# Patient Record
Sex: FEMALE | Race: WHITE | NOT HISPANIC OR LATINO | Employment: OTHER | URBAN - METROPOLITAN AREA
[De-identification: names, ages, dates, MRNs, and addresses within clinical notes are randomized per-mention and may not be internally consistent; named-entity substitution may affect disease eponyms.]

---

## 2017-04-03 RX ORDER — SUMATRIPTAN 50 MG/1
TABLET, FILM COATED ORAL
Qty: 9 TABLET | Refills: 0 | Status: SHIPPED | OUTPATIENT
Start: 2017-04-03 | End: 2018-04-02 | Stop reason: SDUPTHER

## 2017-05-12 RX ORDER — PAROXETINE HYDROCHLORIDE 20 MG/1
TABLET, FILM COATED ORAL
Qty: 90 TABLET | Refills: 0 | Status: SHIPPED | OUTPATIENT
Start: 2017-05-12 | End: 2017-08-16 | Stop reason: SDUPTHER

## 2017-05-30 ENCOUNTER — OFFICE VISIT (OUTPATIENT)
Dept: INTERNAL MEDICINE | Facility: CLINIC | Age: 32
End: 2017-05-30

## 2017-05-30 VITALS
RESPIRATION RATE: 16 BRPM | HEART RATE: 72 BPM | WEIGHT: 143 LBS | HEIGHT: 65 IN | TEMPERATURE: 98.2 F | OXYGEN SATURATION: 98 % | SYSTOLIC BLOOD PRESSURE: 102 MMHG | BODY MASS INDEX: 23.82 KG/M2 | DIASTOLIC BLOOD PRESSURE: 56 MMHG

## 2017-05-30 DIAGNOSIS — Z00.00 ENCOUNTER FOR ANNUAL HEALTH EXAMINATION: Primary | ICD-10-CM

## 2017-05-30 DIAGNOSIS — Z23 NEED FOR PNEUMOCOCCAL VACCINATION: ICD-10-CM

## 2017-05-30 PROCEDURE — 90732 PPSV23 VACC 2 YRS+ SUBQ/IM: CPT | Performed by: INTERNAL MEDICINE

## 2017-05-30 PROCEDURE — 99395 PREV VISIT EST AGE 18-39: CPT | Performed by: INTERNAL MEDICINE

## 2017-05-30 PROCEDURE — 90471 IMMUNIZATION ADMIN: CPT | Performed by: INTERNAL MEDICINE

## 2017-06-01 RX ORDER — ALPRAZOLAM 0.5 MG/1
TABLET ORAL
Qty: 60 TABLET | Refills: 0
Start: 2017-06-01 | End: 2017-12-19 | Stop reason: SDUPTHER

## 2017-06-01 NOTE — TELEPHONE ENCOUNTER
Last Refill:  11.30.2016  Last O.V:      5.30.2017  Next O.V:     11.32.2017  John:          6.1.2017

## 2017-06-05 RX ORDER — ALPRAZOLAM 0.5 MG/1
TABLET ORAL
Qty: 60 TABLET | Refills: 0 | OUTPATIENT
Start: 2017-06-05

## 2017-06-19 RX ORDER — ATORVASTATIN CALCIUM 40 MG/1
TABLET, FILM COATED ORAL
Qty: 30 TABLET | Refills: 4 | Status: SHIPPED | OUTPATIENT
Start: 2017-06-19 | End: 2017-12-04 | Stop reason: SDUPTHER

## 2017-08-16 RX ORDER — PAROXETINE HYDROCHLORIDE 20 MG/1
TABLET, FILM COATED ORAL
Qty: 90 TABLET | Refills: 0 | Status: SHIPPED | OUTPATIENT
Start: 2017-08-16 | End: 2017-11-15 | Stop reason: SDUPTHER

## 2017-11-15 RX ORDER — PAROXETINE HYDROCHLORIDE 20 MG/1
TABLET, FILM COATED ORAL
Qty: 90 TABLET | Refills: 0 | Status: SHIPPED | OUTPATIENT
Start: 2017-11-15 | End: 2018-02-18 | Stop reason: SDUPTHER

## 2017-11-30 ENCOUNTER — OFFICE VISIT (OUTPATIENT)
Dept: INTERNAL MEDICINE | Facility: CLINIC | Age: 32
End: 2017-11-30

## 2017-11-30 VITALS
WEIGHT: 142 LBS | HEIGHT: 65 IN | RESPIRATION RATE: 16 BRPM | SYSTOLIC BLOOD PRESSURE: 108 MMHG | BODY MASS INDEX: 23.66 KG/M2 | OXYGEN SATURATION: 94 % | HEART RATE: 81 BPM | TEMPERATURE: 98 F | DIASTOLIC BLOOD PRESSURE: 64 MMHG

## 2017-11-30 DIAGNOSIS — Z79.899 MEDICATION MANAGEMENT: ICD-10-CM

## 2017-11-30 DIAGNOSIS — E78.5 HYPERLIPIDEMIA, UNSPECIFIED HYPERLIPIDEMIA TYPE: Primary | ICD-10-CM

## 2017-11-30 DIAGNOSIS — F17.200 CURRENT SMOKER: ICD-10-CM

## 2017-11-30 DIAGNOSIS — K21.9 GASTROESOPHAGEAL REFLUX DISEASE, ESOPHAGITIS PRESENCE NOT SPECIFIED: ICD-10-CM

## 2017-11-30 DIAGNOSIS — G43.009 MIGRAINE WITHOUT AURA AND WITHOUT STATUS MIGRAINOSUS, NOT INTRACTABLE: ICD-10-CM

## 2017-11-30 DIAGNOSIS — F41.1 ANXIETY, GENERALIZED: ICD-10-CM

## 2017-11-30 LAB
ALBUMIN SERPL-MCNC: 4.8 G/DL (ref 3.5–5.2)
ALBUMIN/GLOB SERPL: 2.1 G/DL
ALP SERPL-CCNC: 87 U/L (ref 39–117)
ALT SERPL-CCNC: 14 U/L (ref 1–33)
AST SERPL-CCNC: 17 U/L (ref 1–32)
BASOPHILS # BLD AUTO: 0.02 10*3/MM3 (ref 0–0.2)
BASOPHILS NFR BLD AUTO: 0.3 % (ref 0–1.5)
BILIRUB SERPL-MCNC: 0.6 MG/DL (ref 0.1–1.2)
BUN SERPL-MCNC: 16 MG/DL (ref 6–20)
BUN/CREAT SERPL: 23.2 (ref 7–25)
CALCIUM SERPL-MCNC: 9.9 MG/DL (ref 8.6–10.5)
CHLORIDE SERPL-SCNC: 103 MMOL/L (ref 98–107)
CHOLEST SERPL-MCNC: 161 MG/DL (ref 0–200)
CO2 SERPL-SCNC: 24.4 MMOL/L (ref 22–29)
CREAT SERPL-MCNC: 0.69 MG/DL (ref 0.57–1)
EOSINOPHIL # BLD AUTO: 0.09 10*3/MM3 (ref 0–0.7)
EOSINOPHIL NFR BLD AUTO: 1.3 % (ref 0.3–6.2)
ERYTHROCYTE [DISTWIDTH] IN BLOOD BY AUTOMATED COUNT: 12.3 % (ref 11.7–13)
GFR SERPLBLD CREATININE-BSD FMLA CKD-EPI: 119 ML/MIN/1.73
GFR SERPLBLD CREATININE-BSD FMLA CKD-EPI: 99 ML/MIN/1.73
GLOBULIN SER CALC-MCNC: 2.3 GM/DL
GLUCOSE SERPL-MCNC: 91 MG/DL (ref 65–99)
HCT VFR BLD AUTO: 41.3 % (ref 35.6–45.5)
HDLC SERPL-MCNC: 89 MG/DL (ref 40–60)
HGB BLD-MCNC: 13.7 G/DL (ref 11.9–15.5)
IMM GRANULOCYTES # BLD: 0 10*3/MM3 (ref 0–0.03)
IMM GRANULOCYTES NFR BLD: 0 % (ref 0–0.5)
LDLC SERPL CALC-MCNC: 52 MG/DL (ref 0–100)
LYMPHOCYTES # BLD AUTO: 2.32 10*3/MM3 (ref 0.9–4.8)
LYMPHOCYTES NFR BLD AUTO: 33.1 % (ref 19.6–45.3)
MCH RBC QN AUTO: 31.8 PG (ref 26.9–32)
MCHC RBC AUTO-ENTMCNC: 33.2 G/DL (ref 32.4–36.3)
MCV RBC AUTO: 95.8 FL (ref 80.5–98.2)
MONOCYTES # BLD AUTO: 0.52 10*3/MM3 (ref 0.2–1.2)
MONOCYTES NFR BLD AUTO: 7.4 % (ref 5–12)
NEUTROPHILS # BLD AUTO: 4.06 10*3/MM3 (ref 1.9–8.1)
NEUTROPHILS NFR BLD AUTO: 57.9 % (ref 42.7–76)
PLATELET # BLD AUTO: 236 10*3/MM3 (ref 140–500)
POTASSIUM SERPL-SCNC: 4.3 MMOL/L (ref 3.5–5.2)
PROT SERPL-MCNC: 7.1 G/DL (ref 6–8.5)
RBC # BLD AUTO: 4.31 10*6/MM3 (ref 3.9–5.2)
SODIUM SERPL-SCNC: 143 MMOL/L (ref 136–145)
TRIGL SERPL-MCNC: 98 MG/DL (ref 0–150)
VLDLC SERPL CALC-MCNC: 19.6 MG/DL (ref 5–40)
WBC # BLD AUTO: 7.01 10*3/MM3 (ref 4.5–10.7)

## 2017-11-30 PROCEDURE — 99213 OFFICE O/P EST LOW 20 MIN: CPT | Performed by: INTERNAL MEDICINE

## 2017-11-30 PROCEDURE — 99407 BEHAV CHNG SMOKING > 10 MIN: CPT | Performed by: INTERNAL MEDICINE

## 2017-11-30 NOTE — PATIENT INSTRUCTIONS
Steps to Quit Smoking   Smoking tobacco can be harmful to your health and can affect almost every organ in your body. Smoking puts you, and those around you, at risk for developing many serious chronic diseases. Quitting smoking is difficult, but it is one of the best things that you can do for your health. It is never too late to quit.  WHAT ARE THE BENEFITS OF QUITTING SMOKING?  When you quit smoking, you lower your risk of developing serious diseases and conditions, such as:  · Lung cancer or lung disease, such as COPD.  · Heart disease.  · Stroke.  · Heart attack.  · Infertility.  · Osteoporosis and bone fractures.  Additionally, symptoms such as coughing, wheezing, and shortness of breath may get better when you quit. You may also find that you get sick less often because your body is stronger at fighting off colds and infections. If you are pregnant, quitting smoking can help to reduce your chances of having a baby of low birth weight.  HOW DO I GET READY TO QUIT?  When you decide to quit smoking, create a plan to make sure that you are successful. Before you quit:  · Pick a date to quit. Set a date within the next two weeks to give you time to prepare.  · Write down the reasons why you are quitting. Keep this list in places where you will see it often, such as on your bathroom mirror or in your car or wallet.  · Identify the people, places, things, and activities that make you want to smoke (triggers) and avoid them. Make sure to take these actions:    Throw away all cigarettes at home, at work, and in your car.    Throw away smoking accessories, such as ashtrays and lighters.    Clean your car and make sure to empty the ashtray.    Clean your home, including curtains and carpets.  · Tell your family, friends, and coworkers that you are quitting. Support from your loved ones can make quitting easier.  · Talk with your health care provider about your options for quitting smoking.  · Find out what treatment  "options are covered by your health insurance.  WHAT STRATEGIES CAN I USE TO QUIT SMOKING?   Talk with your healthcare provider about different strategies to quit smoking. Some strategies include:  · Quitting smoking altogether instead of gradually lessening how much you smoke over a period of time. Research shows that quitting \"cold turkey\" is more successful than gradually quitting.  · Attending in-person counseling to help you build problem-solving skills. You are more likely to have success in quitting if you attend several counseling sessions. Even short sessions of 10 minutes can be effective.  · Finding resources and support systems that can help you to quit smoking and remain smoke-free after you quit. These resources are most helpful when you use them often. They can include:    Online chats with a counselor.    Telephone quitlines.    Printed self-help materials.    Support groups or group counseling.    Text messaging programs.    Mobile phone applications.  · Taking medicines to help you quit smoking. (If you are pregnant or breastfeeding, talk with your health care provider first.) Some medicines contain nicotine and some do not. Both types of medicines help with cravings, but the medicines that include nicotine help to relieve withdrawal symptoms. Your health care provider may recommend:    Nicotine patches, gum, or lozenges.    Nicotine inhalers or sprays.    Non-nicotine medicine that is taken by mouth.  Talk with your health care provider about combining strategies, such as taking medicines while you are also receiving in-person counseling. Using these two strategies together makes you more likely to succeed in quitting than if you used either strategy on its own.  If you are pregnant or breastfeeding, talk with your health care provider about finding counseling or other support strategies to quit smoking. Do not take medicine to help you quit smoking unless told to do so by your health care " provider.  WHAT THINGS CAN I DO TO MAKE IT EASIER TO QUIT?  Quitting smoking might feel overwhelming at first, but there is a lot that you can do to make it easier. Take these important actions:  · Reach out to your family and friends and ask that they support and encourage you during this time. Call telephone quitlines, reach out to support groups, or work with a counselor for support.  · Ask people who smoke to avoid smoking around you.  · Avoid places that trigger you to smoke, such as bars, parties, or smoke-break areas at work.  · Spend time around people who do not smoke.  · Lessen stress in your life, because stress can be a smoking trigger for some people. To lessen stress, try:    Exercising regularly.    Deep-breathing exercises.    Yoga.    Meditating.    Performing a body scan. This involves closing your eyes, scanning your body from head to toe, and noticing which parts of your body are particularly tense. Purposefully relax the muscles in those areas.  · Download or purchase mobile phone or tablet apps (applications) that can help you stick to your quit plan by providing reminders, tips, and encouragement. There are many free apps, such as QuitGuide from the CDC (Centers for Disease Control and Prevention). You can find other support for quitting smoking (smoking cessation) through smokefree.gov and other websites.  HOW WILL I FEEL WHEN I QUIT SMOKING?  Within the first 24 hours of quitting smoking, you may start to feel some withdrawal symptoms. These symptoms are usually most noticeable 2-3 days after quitting, but they usually do not last beyond 2-3 weeks. Changes or symptoms that you might experience include:  · Mood swings.  · Restlessness, anxiety, or irritation.  · Difficulty concentrating.  · Dizziness.  · Strong cravings for sugary foods in addition to nicotine.  · Mild weight gain.  · Constipation.  · Nausea.  · Coughing or a sore throat.  · Changes in how your medicines work in your  body.  · A depressed mood.  · Difficulty sleeping (insomnia).  After the first 2-3 weeks of quitting, you may start to notice more positive results, such as:  · Improved sense of smell and taste.  · Decreased coughing and sore throat.  · Slower heart rate.  · Lower blood pressure.  · Clearer skin.  · The ability to breathe more easily.  · Fewer sick days.  Quitting smoking is very challenging for most people. Do not get discouraged if you are not successful the first time. Some people need to make many attempts to quit before they achieve long-term success. Do your best to stick to your quit plan, and talk with your health care provider if you have any questions or concerns.     This information is not intended to replace advice given to you by your health care provider. Make sure you discuss any questions you have with your health care provider.     Document Released: 12/12/2002 Document Revised: 05/03/2016 Document Reviewed: 05/03/2016  [employer] Interactive Patient Education ©2017 [employer]

## 2017-11-30 NOTE — PROGRESS NOTES
Subjective   Erinn Robert is a 32 y.o. female.     Chief Complaint   Patient presents with   • Hyperlipidemia   • Anxiety         Hyperlipidemia   This is a chronic problem. The current episode started more than 1 year ago. The problem is controlled. Recent lipid tests were reviewed and are normal. There are no known factors aggravating her hyperlipidemia. Pertinent negatives include no chest pain or shortness of breath. Current antihyperlipidemic treatment includes statins. The current treatment provides significant improvement of lipids. There are no compliance problems.  Risk factors for coronary artery disease include dyslipidemia.   Anxiety   Presents for follow-up visit. Symptoms include nervous/anxious behavior. Patient reports no chest pain or shortness of breath. Symptoms occur occasionally.       Heartburn   She complains of heartburn. She reports no abdominal pain, no chest pain, no coughing or no wheezing. This is a chronic problem. The current episode started more than 1 year ago. The problem occurs occasionally. The problem has been unchanged. Pertinent negatives include no fatigue. She has tried a PPI for the symptoms. The treatment provided moderate relief.        The following portions of the patient's history were reviewed and updated as appropriate: allergies, current medications, past social history and problem list.    Outpatient Prescriptions Marked as Taking for the 11/30/17 encounter (Office Visit) with Karri Jones MD   Medication Sig Dispense Refill   • ALPRAZolam (XANAX) 0.5 MG tablet TAKE ONE TABLET BY MOUTH DAILY AS NEEDED FOR ANXIETY 60 tablet 0   • atorvastatin (LIPITOR) 40 MG tablet TAKE ONE TABLET BY MOUTH DAILY 30 tablet 4   • Multiple Vitamins-Minerals (MULTI-VITAMIN/MINERALS PO) Take  by mouth daily.     • PARoxetine (PAXIL) 20 MG tablet TAKE ONE TABLET BY MOUTH DAILY 90 tablet 0   • SUMAtriptan (IMITREX) 50 MG tablet TAKE ONE TABLET BY MOUTH DAILY AS NEEDED 9 tablet 0        Review of Systems   Constitutional: Negative for fatigue.   Respiratory: Negative for cough, shortness of breath and wheezing.    Cardiovascular: Negative for chest pain.   Gastrointestinal: Positive for heartburn. Negative for abdominal pain.   Psychiatric/Behavioral: The patient is nervous/anxious.        Objective   Vitals:    11/30/17 0804   BP: 108/64   Pulse: 81   Resp: 16   Temp: 98 °F (36.7 °C)   SpO2: 94%      Last Weight    11/30/17 0804   Weight: 142 lb (64.4 kg)    [unfilled]  Body mass index is 23.63 kg/(m^2).      Physical Exam   Constitutional: She appears well-developed and well-nourished. No distress.   HENT:   Head: Normocephalic and atraumatic.   Neck: Carotid bruit is not present. No thyromegaly present.   Cardiovascular: Normal rate, regular rhythm, normal heart sounds and intact distal pulses.  Exam reveals no gallop.    No murmur heard.  Pulmonary/Chest: Effort normal and breath sounds normal. No respiratory distress. She has no wheezes. She has no rales.   Abdominal: Soft. Bowel sounds are normal. She exhibits no mass. There is no tenderness. There is no guarding.         Problem List Items Addressed This Visit        Cardiovascular and Mediastinum    Migraine    Hyperlipidemia - Primary       Digestive    GERD (gastroesophageal reflux disease)       Other    Anxiety, generalized      Other Visit Diagnoses     Current smoker            Assessment/Plan   In for check of hyperlipidemia.  She has GERD but none recently and on no treatment.  That's more related to stress than anything.  This responded PPIs however.  Known gallstones.  Recheck lipids today.  Annual lab work today and annual preventative exam May 2017.  Declines flu shots.         Dragon disclaimer:   Much of this encounter note is an electronic transcription/translation of spoken language to printed text. The electronic translation of spoken language may permit erroneous, or at times, nonsensical words or phrases to be  inadvertently transcribed; Although I have reviewed the note for such errors, some may still exist.

## 2017-12-04 RX ORDER — ATORVASTATIN CALCIUM 40 MG/1
TABLET, FILM COATED ORAL
Qty: 30 TABLET | Refills: 3 | Status: SHIPPED | OUTPATIENT
Start: 2017-12-04 | End: 2018-03-03 | Stop reason: SDUPTHER

## 2017-12-19 RX ORDER — ALPRAZOLAM 0.5 MG/1
TABLET ORAL
Qty: 30 TABLET | Refills: 0 | OUTPATIENT
Start: 2017-12-19 | End: 2018-06-04 | Stop reason: SDUPTHER

## 2018-02-19 RX ORDER — PAROXETINE HYDROCHLORIDE 20 MG/1
TABLET, FILM COATED ORAL
Qty: 90 TABLET | Refills: 0 | Status: SHIPPED | OUTPATIENT
Start: 2018-02-19 | End: 2018-05-24 | Stop reason: SDUPTHER

## 2018-03-05 RX ORDER — ATORVASTATIN CALCIUM 40 MG/1
TABLET, FILM COATED ORAL
Qty: 90 TABLET | Refills: 2 | Status: SHIPPED | OUTPATIENT
Start: 2018-03-05 | End: 2018-11-28 | Stop reason: SDUPTHER

## 2018-04-02 RX ORDER — SUMATRIPTAN 50 MG/1
TABLET, FILM COATED ORAL
Qty: 9 TABLET | Refills: 0 | Status: SHIPPED | OUTPATIENT
Start: 2018-04-02 | End: 2018-06-20 | Stop reason: SDUPTHER

## 2018-05-24 RX ORDER — PAROXETINE HYDROCHLORIDE 20 MG/1
TABLET, FILM COATED ORAL
Qty: 90 TABLET | Refills: 0 | Status: SHIPPED | OUTPATIENT
Start: 2018-05-24 | End: 2018-08-24 | Stop reason: SDUPTHER

## 2018-06-01 ENCOUNTER — OFFICE VISIT (OUTPATIENT)
Dept: INTERNAL MEDICINE | Facility: CLINIC | Age: 33
End: 2018-06-01

## 2018-06-01 VITALS
TEMPERATURE: 98.7 F | BODY MASS INDEX: 24.19 KG/M2 | HEIGHT: 65 IN | DIASTOLIC BLOOD PRESSURE: 64 MMHG | SYSTOLIC BLOOD PRESSURE: 110 MMHG | RESPIRATION RATE: 16 BRPM | OXYGEN SATURATION: 94 % | HEART RATE: 68 BPM | WEIGHT: 145.2 LBS

## 2018-06-01 DIAGNOSIS — Z79.899 LONG TERM USE OF DRUG: ICD-10-CM

## 2018-06-01 DIAGNOSIS — F41.1 ANXIETY, GENERALIZED: ICD-10-CM

## 2018-06-01 DIAGNOSIS — Z00.00 ENCOUNTER FOR ANNUAL HEALTH EXAMINATION: Primary | ICD-10-CM

## 2018-06-01 DIAGNOSIS — E78.5 HYPERLIPIDEMIA, UNSPECIFIED HYPERLIPIDEMIA TYPE: ICD-10-CM

## 2018-06-01 LAB
CHOLEST SERPL-MCNC: 187 MG/DL (ref 0–200)
HDLC SERPL-MCNC: 89 MG/DL (ref 40–60)
LDLC SERPL CALC-MCNC: 76 MG/DL (ref 0–100)
TRIGL SERPL-MCNC: 111 MG/DL (ref 0–150)
VLDLC SERPL CALC-MCNC: 22.2 MG/DL (ref 5–40)

## 2018-06-01 PROCEDURE — 99395 PREV VISIT EST AGE 18-39: CPT | Performed by: INTERNAL MEDICINE

## 2018-06-01 NOTE — PROGRESS NOTES
Subjective   Ernin Robert is a 33 y.o. female.     Chief Complaint   Patient presents with   • Annual Exam         Impression annual preventative exam.  Sleep is good.  Gets 11 hours at night.  Exercises 2-3 days per week.  Energy is good.  Diet is well-balanced.           The following portions of the patient's history were reviewed and updated as appropriate: allergies, current medications, past social history and problem list.    HISTORY  Outpatient Prescriptions Marked as Taking for the 6/1/18 encounter (Office Visit) with Karri Jones MD   Medication Sig Dispense Refill   • ALPRAZolam (XANAX) 0.5 MG tablet TAKE ONE TABLET BY MOUTH EVERY DAY AS NEEDED 30 tablet 0   • atorvastatin (LIPITOR) 40 MG tablet TAKE ONE TABLET BY MOUTH DAILY 90 tablet 2   • Multiple Vitamins-Minerals (MULTI-VITAMIN/MINERALS PO) Take  by mouth daily.     • PARoxetine (PAXIL) 20 MG tablet TAKE ONE TABLET BY MOUTH DAILY 90 tablet 0   • SUMAtriptan (IMITREX) 50 MG tablet TAKE ONE TABLET BY MOUTH DAILY AS NEEDED 9 tablet 0     Social History     Social History   • Marital status: Single     Spouse name: N/A   • Number of children: N/A   • Years of education: N/A     Occupational History   • Not on file.     Social History Main Topics   • Smoking status: Current Some Day Smoker     Types: Cigarettes     Start date: 2006   • Smokeless tobacco: Never Used      Comment: 10 CIGARETTES X WEEK   • Alcohol use 4.8 oz/week     8 Glasses of wine per week      Comment: WIne: 8 glasses x week   • Drug use: Unknown   • Sexual activity: Not on file     Other Topics Concern   • Not on file     Social History Narrative   • No narrative on file     Family History   Problem Relation Age of Onset   • No Known Problems Mother    • No Known Problems Father    • No Known Problems Brother    • No Known Problems Brother      No past medical history on file.  Past Surgical History:   Procedure Laterality Date   • WISDOM TOOTH EXTRACTION         Review of Systems    Constitutional: Negative for appetite change, chills, fatigue and fever.   HENT: Negative for congestion, ear pain, hearing loss, nosebleeds, postnasal drip, rhinorrhea, sinus pressure and trouble swallowing.    Eyes: Negative for pain, itching and visual disturbance.   Respiratory: Negative for cough, chest tightness, shortness of breath and wheezing.    Cardiovascular: Negative for chest pain, palpitations and leg swelling.   Gastrointestinal: Positive for abdominal pain (GERD controlled with prn ppi). Negative for anal bleeding, constipation, diarrhea, nausea, rectal pain and vomiting.   Endocrine: Negative for cold intolerance, heat intolerance and polyuria.   Genitourinary: Negative for difficulty urinating, dysuria, flank pain, frequency, hematuria and urgency.   Musculoskeletal: Negative for arthralgias, back pain and myalgias.   Skin: Negative for rash.   Allergic/Immunologic: Positive for environmental allergies.   Neurological: Positive for headaches. Negative for dizziness, syncope, speech difficulty, weakness, light-headedness and numbness.   Hematological: Does not bruise/bleed easily.   Psychiatric/Behavioral: Positive for dysphoric mood. Negative for agitation, confusion and sleep disturbance. The patient is nervous/anxious.        Objective   Vitals:    06/01/18 0958   BP: 110/64   Pulse: 68   Resp: 16   Temp: 98.7 °F (37.1 °C)   SpO2: 94%      1    06/01/18  0958   Weight: 65.9 kg (145 lb 3.2 oz)    [unfilled]  Body mass index is 24.16 kg/m².      Physical Exam   Constitutional: She is oriented to person, place, and time. She appears well-developed and well-nourished.   HENT:   Head: Normocephalic and atraumatic.   Right Ear: External ear normal.   Left Ear: External ear normal.   Nose: Nose normal.   Mouth/Throat: Oropharynx is clear and moist.   Eyes: Conjunctivae and EOM are normal. Pupils are equal, round, and reactive to light.   Neck: Normal range of motion. Neck supple. No JVD present.  No thyromegaly present.   Cardiovascular: Normal rate, regular rhythm, normal heart sounds and intact distal pulses.  Exam reveals no gallop.    No murmur heard.  Pulmonary/Chest: Effort normal and breath sounds normal. No respiratory distress. She has no wheezes. She has no rales.   Abdominal: Soft. Bowel sounds are normal. She exhibits no distension and no mass. There is no tenderness. There is no guarding. No hernia.   Musculoskeletal: Normal range of motion. She exhibits no edema.   Lymphadenopathy:     She has no cervical adenopathy.   Neurological: She is alert and oriented to person, place, and time. She displays normal reflexes. No cranial nerve deficit. Coordination normal.   Skin: Skin is warm and dry.   Psychiatric: She has a normal mood and affect. Her behavior is normal. Judgment and thought content normal.   Nursing note and vitals reviewed.        Problem List Items Addressed This Visit        Cardiovascular and Mediastinum    Hyperlipidemia       Other    Anxiety, generalized      Other Visit Diagnoses     Encounter for annual health examination    -  Primary    Long term use of drug        Relevant Orders    Compliance Drug Analysis, Ur - Urine, Clean Catch        Assessment/Plan   In for annual preventative exam and check of hyperlipidemia.  She has GERD but that's more related to stress than anything.  This responded PPIs however.  Known gallstones.  Allergic rhinitis.  Chronic anxiety and depression.  Recheck lipids every 6 months.  Annual lab work including CBC, CMP, and lipids and annual preventative exam November 2018.  Quit smoking 5 days ago.         Dragon disclaimer:   Much of this encounter note is an electronic transcription/translation of spoken language to printed text. The electronic translation of spoken language may permit erroneous, or at times, nonsensical words or phrases to be inadvertently transcribed; Although I have reviewed the note for such errors, some may still exist.

## 2018-06-05 RX ORDER — ALPRAZOLAM 0.5 MG/1
TABLET ORAL
Qty: 30 TABLET | Refills: 0 | OUTPATIENT
Start: 2018-06-05 | End: 2018-08-17 | Stop reason: SDUPTHER

## 2018-06-07 LAB — DRUGS UR: NORMAL

## 2018-06-20 RX ORDER — SUMATRIPTAN 50 MG/1
TABLET, FILM COATED ORAL
Qty: 9 TABLET | Refills: 0 | Status: SHIPPED | OUTPATIENT
Start: 2018-06-20 | End: 2018-08-29 | Stop reason: SDUPTHER

## 2018-08-17 RX ORDER — ALPRAZOLAM 0.5 MG/1
TABLET ORAL
Qty: 30 TABLET | Refills: 0 | OUTPATIENT
Start: 2018-08-17 | End: 2019-01-01 | Stop reason: SDUPTHER

## 2018-08-24 RX ORDER — PAROXETINE HYDROCHLORIDE 20 MG/1
TABLET, FILM COATED ORAL
Qty: 90 TABLET | Refills: 0 | Status: SHIPPED | OUTPATIENT
Start: 2018-08-24 | End: 2018-11-22 | Stop reason: SDUPTHER

## 2018-08-29 RX ORDER — SUMATRIPTAN 50 MG/1
TABLET, FILM COATED ORAL
Qty: 9 TABLET | Refills: 0 | Status: SHIPPED | OUTPATIENT
Start: 2018-08-29 | End: 2018-12-03 | Stop reason: SDUPTHER

## 2018-11-26 RX ORDER — PAROXETINE HYDROCHLORIDE 20 MG/1
TABLET, FILM COATED ORAL
Qty: 90 TABLET | Refills: 0 | Status: SHIPPED | OUTPATIENT
Start: 2018-11-26 | End: 2019-02-22 | Stop reason: SDUPTHER

## 2018-11-28 RX ORDER — ATORVASTATIN CALCIUM 40 MG/1
TABLET, FILM COATED ORAL
Qty: 90 TABLET | Refills: 1 | Status: SHIPPED | OUTPATIENT
Start: 2018-11-28 | End: 2019-05-29 | Stop reason: SDUPTHER

## 2018-11-30 ENCOUNTER — OFFICE VISIT (OUTPATIENT)
Dept: INTERNAL MEDICINE | Facility: CLINIC | Age: 33
End: 2018-11-30

## 2018-11-30 VITALS
OXYGEN SATURATION: 94 % | WEIGHT: 150 LBS | BODY MASS INDEX: 24.99 KG/M2 | TEMPERATURE: 98 F | SYSTOLIC BLOOD PRESSURE: 116 MMHG | RESPIRATION RATE: 16 BRPM | DIASTOLIC BLOOD PRESSURE: 84 MMHG | HEIGHT: 65 IN | HEART RATE: 84 BPM

## 2018-11-30 DIAGNOSIS — Z79.899 MEDICATION MANAGEMENT: ICD-10-CM

## 2018-11-30 DIAGNOSIS — E78.5 HYPERLIPIDEMIA, UNSPECIFIED HYPERLIPIDEMIA TYPE: Primary | ICD-10-CM

## 2018-11-30 DIAGNOSIS — G43.009 MIGRAINE WITHOUT AURA AND WITHOUT STATUS MIGRAINOSUS, NOT INTRACTABLE: ICD-10-CM

## 2018-11-30 DIAGNOSIS — F41.1 ANXIETY, GENERALIZED: ICD-10-CM

## 2018-11-30 LAB
ALBUMIN SERPL-MCNC: 5 G/DL (ref 3.5–5.2)
ALBUMIN/GLOB SERPL: 1.8 G/DL
ALP SERPL-CCNC: 90 U/L (ref 39–117)
ALT SERPL-CCNC: 18 U/L (ref 1–33)
AST SERPL-CCNC: 22 U/L (ref 1–32)
BASOPHILS # BLD AUTO: 0.01 10*3/MM3 (ref 0–0.2)
BASOPHILS NFR BLD AUTO: 0.1 % (ref 0–1.5)
BILIRUB SERPL-MCNC: 0.6 MG/DL (ref 0.1–1.2)
BUN SERPL-MCNC: 12 MG/DL (ref 6–20)
BUN/CREAT SERPL: 16 (ref 7–25)
CALCIUM SERPL-MCNC: 10.4 MG/DL (ref 8.6–10.5)
CHLORIDE SERPL-SCNC: 101 MMOL/L (ref 98–107)
CHOLEST SERPL-MCNC: 196 MG/DL (ref 0–200)
CO2 SERPL-SCNC: 25.8 MMOL/L (ref 22–29)
CREAT SERPL-MCNC: 0.75 MG/DL (ref 0.57–1)
EOSINOPHIL # BLD AUTO: 0.11 10*3/MM3 (ref 0–0.7)
EOSINOPHIL NFR BLD AUTO: 1.2 % (ref 0.3–6.2)
ERYTHROCYTE [DISTWIDTH] IN BLOOD BY AUTOMATED COUNT: 12.4 % (ref 11.7–13)
GLOBULIN SER CALC-MCNC: 2.8 GM/DL
GLUCOSE SERPL-MCNC: 86 MG/DL (ref 65–99)
HCT VFR BLD AUTO: 44.1 % (ref 35.6–45.5)
HDLC SERPL-MCNC: 86 MG/DL (ref 40–60)
HGB BLD-MCNC: 14.2 G/DL (ref 11.9–15.5)
IMM GRANULOCYTES # BLD: 0.02 10*3/MM3 (ref 0–0.03)
IMM GRANULOCYTES NFR BLD: 0.2 % (ref 0–0.5)
LDLC SERPL CALC-MCNC: 87 MG/DL (ref 0–100)
LYMPHOCYTES # BLD AUTO: 2.53 10*3/MM3 (ref 0.9–4.8)
LYMPHOCYTES NFR BLD AUTO: 26.6 % (ref 19.6–45.3)
MCH RBC QN AUTO: 31.8 PG (ref 26.9–32)
MCHC RBC AUTO-ENTMCNC: 32.2 G/DL (ref 32.4–36.3)
MCV RBC AUTO: 98.7 FL (ref 80.5–98.2)
MONOCYTES # BLD AUTO: 0.71 10*3/MM3 (ref 0.2–1.2)
MONOCYTES NFR BLD AUTO: 7.5 % (ref 5–12)
NEUTROPHILS # BLD AUTO: 6.12 10*3/MM3 (ref 1.9–8.1)
NEUTROPHILS NFR BLD AUTO: 64.4 % (ref 42.7–76)
PLATELET # BLD AUTO: 273 10*3/MM3 (ref 140–500)
POTASSIUM SERPL-SCNC: 4.5 MMOL/L (ref 3.5–5.2)
PROT SERPL-MCNC: 7.8 G/DL (ref 6–8.5)
RBC # BLD AUTO: 4.47 10*6/MM3 (ref 3.9–5.2)
SODIUM SERPL-SCNC: 142 MMOL/L (ref 136–145)
TRIGL SERPL-MCNC: 116 MG/DL (ref 0–150)
VLDLC SERPL CALC-MCNC: 23.2 MG/DL (ref 5–40)
WBC # BLD AUTO: 9.5 10*3/MM3 (ref 4.5–10.7)

## 2018-11-30 PROCEDURE — 99213 OFFICE O/P EST LOW 20 MIN: CPT | Performed by: INTERNAL MEDICINE

## 2018-11-30 RX ORDER — CHLORAL HYDRATE 500 MG
1000 CAPSULE ORAL
COMMUNITY
End: 2021-03-26

## 2018-11-30 NOTE — PROGRESS NOTES
Subjective   Erinn Robert is a 33 y.o. female.     Chief Complaint   Patient presents with   • Hyperlipidemia         Hyperlipidemia   This is a chronic problem. The current episode started more than 1 year ago. The problem is controlled. Recent lipid tests were reviewed and are normal. There are no known factors aggravating her hyperlipidemia. Pertinent negatives include no chest pain or shortness of breath. Current antihyperlipidemic treatment includes statins. The current treatment provides significant improvement of lipids. There are no compliance problems.  Risk factors for coronary artery disease include dyslipidemia.   Anxiety   Presents for follow-up visit. Symptoms include nervous/anxious behavior. Patient reports no chest pain or shortness of breath. Symptoms occur occasionally.       Heartburn   She complains of heartburn. She reports no abdominal pain, no chest pain, no coughing or no wheezing. This is a chronic problem. The current episode started more than 1 year ago. The problem occurs occasionally. The problem has been unchanged. Pertinent negatives include no fatigue. She has tried a PPI for the symptoms. The treatment provided moderate relief.        The following portions of the patient's history were reviewed and updated as appropriate: allergies, current medications, past social history and problem list.    Outpatient Medications Marked as Taking for the 11/30/18 encounter (Office Visit) with Karri Jones MD   Medication Sig Dispense Refill   • ALPRAZolam (XANAX) 0.5 MG tablet TAKE ONE TABLET BY MOUTH DAILY AS NEEDED 30 tablet 0   • atorvastatin (LIPITOR) 40 MG tablet TAKE ONE TABLET BY MOUTH DAILY 90 tablet 1   • Omega-3 Fatty Acids (FISH OIL) 1000 MG capsule capsule Take 1,000 mg by mouth Daily With Breakfast.     • PARoxetine (PAXIL) 20 MG tablet TAKE ONE TABLET BY MOUTH DAILY 90 tablet 0   • SUMAtriptan (IMITREX) 50 MG tablet TAKE ONE TABLET BY MOUTH DAILY AS NEEDED 9 tablet 0   •  [DISCONTINUED] Multiple Vitamins-Minerals (MULTI-VITAMIN/MINERALS PO) Take  by mouth daily.         Review of Systems   Constitutional: Negative for fatigue.   Respiratory: Negative for cough, shortness of breath and wheezing.    Cardiovascular: Negative for chest pain.   Gastrointestinal: Positive for heartburn. Negative for abdominal pain.   Psychiatric/Behavioral: The patient is nervous/anxious.        Objective   Vitals:    11/30/18 0959   BP: 116/84   Pulse: 84   Resp: 16   Temp: 98 °F (36.7 °C)   SpO2: 94%          11/30/18 0959   Weight: 68 kg (150 lb)    [unfilled]  Body mass index is 24.96 kg/m².      Physical Exam   Constitutional: She appears well-developed and well-nourished. No distress.   HENT:   Head: Normocephalic and atraumatic.   Neck: Carotid bruit is not present. No thyromegaly present.   Cardiovascular: Normal rate, regular rhythm, normal heart sounds and intact distal pulses. Exam reveals no gallop.   No murmur heard.  Pulmonary/Chest: Effort normal and breath sounds normal. No respiratory distress. She has no wheezes. She has no rales.   Abdominal: Soft. Bowel sounds are normal. She exhibits no mass. There is no tenderness. There is no guarding.         Problem List Items Addressed This Visit        Cardiovascular and Mediastinum    Migraine    Hyperlipidemia - Primary       Other    Anxiety, generalized        Assessment/Plan   In for check of hyperlipidemia.  She has GERD but none recently and on no treatment.  That's more related to stress than anything.  This responded PPIs however.  Known gallstones.  Recheck lipids q 6 mos.  Annual lab work today and annual preventative exam May 2017.  Declines flu shots.         Dragon disclaimer:   Much of this encounter note is an electronic transcription/translation of spoken language to printed text. The electronic translation of spoken language may permit erroneous, or at times, nonsensical words or phrases to be inadvertently transcribed;  Although I have reviewed the note for such errors, some may still exist.

## 2018-12-03 RX ORDER — SUMATRIPTAN 50 MG/1
TABLET, FILM COATED ORAL
Qty: 9 TABLET | Refills: 0 | Status: SHIPPED | OUTPATIENT
Start: 2018-12-03 | End: 2019-03-03 | Stop reason: SDUPTHER

## 2019-01-02 RX ORDER — ALPRAZOLAM 0.5 MG/1
TABLET ORAL
Qty: 30 TABLET | Refills: 0 | OUTPATIENT
Start: 2019-01-02 | End: 2019-03-03 | Stop reason: SDUPTHER

## 2019-02-22 RX ORDER — PAROXETINE HYDROCHLORIDE 20 MG/1
TABLET, FILM COATED ORAL
Qty: 90 TABLET | Refills: 0 | Status: SHIPPED | OUTPATIENT
Start: 2019-02-22 | End: 2019-05-29 | Stop reason: SDUPTHER

## 2019-03-04 RX ORDER — SUMATRIPTAN 50 MG/1
TABLET, FILM COATED ORAL
Qty: 9 TABLET | Refills: 0 | Status: SHIPPED | OUTPATIENT
Start: 2019-03-04 | End: 2019-05-06 | Stop reason: SDUPTHER

## 2019-03-04 RX ORDER — ALPRAZOLAM 0.5 MG/1
TABLET ORAL
Qty: 30 TABLET | Refills: 0 | OUTPATIENT
Start: 2019-03-04 | End: 2019-06-14 | Stop reason: SDUPTHER

## 2019-05-06 RX ORDER — SUMATRIPTAN 50 MG/1
TABLET, FILM COATED ORAL
Qty: 9 TABLET | Refills: 0 | Status: SHIPPED | OUTPATIENT
Start: 2019-05-06 | End: 2019-06-14 | Stop reason: SDUPTHER

## 2019-05-29 RX ORDER — ATORVASTATIN CALCIUM 40 MG/1
TABLET, FILM COATED ORAL
Qty: 90 TABLET | Refills: 0 | Status: SHIPPED | OUTPATIENT
Start: 2019-05-29 | End: 2019-08-30 | Stop reason: SDUPTHER

## 2019-05-29 RX ORDER — PAROXETINE HYDROCHLORIDE 20 MG/1
TABLET, FILM COATED ORAL
Qty: 90 TABLET | Refills: 0 | Status: SHIPPED | OUTPATIENT
Start: 2019-05-29 | End: 2019-08-30 | Stop reason: SDUPTHER

## 2019-06-11 ENCOUNTER — OFFICE VISIT (OUTPATIENT)
Dept: INTERNAL MEDICINE | Facility: CLINIC | Age: 34
End: 2019-06-11

## 2019-06-11 VITALS
HEIGHT: 67 IN | TEMPERATURE: 98.4 F | SYSTOLIC BLOOD PRESSURE: 110 MMHG | DIASTOLIC BLOOD PRESSURE: 68 MMHG | BODY MASS INDEX: 22.13 KG/M2 | HEART RATE: 76 BPM | OXYGEN SATURATION: 98 % | WEIGHT: 141 LBS

## 2019-06-11 DIAGNOSIS — F34.1 DYSTHYMIA: ICD-10-CM

## 2019-06-11 DIAGNOSIS — Z00.00 ENCOUNTER FOR PREVENTIVE HEALTH EXAMINATION: Primary | ICD-10-CM

## 2019-06-11 DIAGNOSIS — E78.5 HYPERLIPIDEMIA, UNSPECIFIED HYPERLIPIDEMIA TYPE: ICD-10-CM

## 2019-06-11 DIAGNOSIS — F41.1 ANXIETY, GENERALIZED: ICD-10-CM

## 2019-06-11 DIAGNOSIS — Z79.899 LONG TERM USE OF DRUG: ICD-10-CM

## 2019-06-11 LAB
BILIRUB BLD-MCNC: ABNORMAL MG/DL
CHOLEST SERPL-MCNC: 174 MG/DL (ref 0–200)
CLARITY, POC: CLEAR
COLOR UR: YELLOW
GLUCOSE UR STRIP-MCNC: NEGATIVE MG/DL
HDLC SERPL-MCNC: 79 MG/DL (ref 40–60)
KETONES UR QL: NEGATIVE
LDLC SERPL CALC-MCNC: 51 MG/DL (ref 0–100)
LEUKOCYTE EST, POC: NEGATIVE
NITRITE UR-MCNC: NEGATIVE MG/ML
PH UR: 6 [PH] (ref 5–8)
PROT UR STRIP-MCNC: ABNORMAL MG/DL
RBC # UR STRIP: ABNORMAL /UL
SP GR UR: 1.03 (ref 1–1.03)
TRIGL SERPL-MCNC: 222 MG/DL (ref 0–150)
UROBILINOGEN UR QL: NORMAL
VLDLC SERPL CALC-MCNC: 44.4 MG/DL

## 2019-06-11 PROCEDURE — 99213 OFFICE O/P EST LOW 20 MIN: CPT | Performed by: INTERNAL MEDICINE

## 2019-06-11 PROCEDURE — 81003 URINALYSIS AUTO W/O SCOPE: CPT | Performed by: INTERNAL MEDICINE

## 2019-06-11 PROCEDURE — 99395 PREV VISIT EST AGE 18-39: CPT | Performed by: INTERNAL MEDICINE

## 2019-06-11 RX ORDER — ECONAZOLE NITRATE 10 MG/G
AEROSOL, FOAM TOPICAL
COMMUNITY
Start: 2019-06-06 | End: 2019-12-12

## 2019-06-11 NOTE — PROGRESS NOTES
Subjective   Erinn Robert is a 34 y.o. female.     Chief Complaint   Patient presents with   • Annual Exam         In for annual preventative exam.  Sleep is good.  Gets 11 hours at night.  Exercises 2-3 days per week.  Energy is good.  Diet is well-balanced.           The following portions of the patient's history were reviewed and updated as appropriate: allergies, current medications, past social history and problem list.    HISTORY  Outpatient Medications Marked as Taking for the 6/11/19 encounter (Office Visit) with Karri Jones MD   Medication Sig Dispense Refill   • ALPRAZolam (XANAX) 0.5 MG tablet TAKE ONE TABLET BY MOUTH DAILY AS NEEDED 30 tablet 0   • atorvastatin (LIPITOR) 40 MG tablet TAKE ONE TABLET BY MOUTH DAILY 90 tablet 0   • ECOZA 1 % foam      • Omega-3 Fatty Acids (FISH OIL) 1000 MG capsule capsule Take 1,000 mg by mouth Daily With Breakfast.     • PARoxetine (PAXIL) 20 MG tablet TAKE ONE TABLET BY MOUTH DAILY 90 tablet 0   • SPRINTEC 28 0.25-35 MG-MCG per tablet Take 1 tablet by mouth Daily.     • SUMAtriptan (IMITREX) 50 MG tablet TAKE ONE TABLET BY MOUTH DAILY AS NEEDED 9 tablet 0     Social History     Socioeconomic History   • Marital status: Single     Spouse name: Not on file   • Number of children: Not on file   • Years of education: Not on file   • Highest education level: Not on file   Tobacco Use   • Smoking status: Current Some Day Smoker     Types: Cigarettes     Start date: 2006   • Smokeless tobacco: Never Used   • Tobacco comment: 10 CIGARETTES X WEEK   Substance and Sexual Activity   • Alcohol use: Yes     Alcohol/week: 4.8 oz     Types: 8 Glasses of wine per week     Comment: WIne: 8 glasses x week     Family History   Problem Relation Age of Onset   • No Known Problems Mother    • No Known Problems Father    • No Known Problems Brother    • No Known Problems Brother      No past medical history on file.  Past Surgical History:   Procedure Laterality Date   • WISDOM TOOTH  EXTRACTION         Review of Systems   Constitutional: Negative for appetite change, chills, fatigue and fever.   HENT: Negative for congestion, ear pain, hearing loss, nosebleeds, postnasal drip, rhinorrhea, sinus pressure and trouble swallowing.    Eyes: Negative for pain, itching and visual disturbance.   Respiratory: Negative for cough, chest tightness, shortness of breath and wheezing.    Cardiovascular: Negative for chest pain, palpitations and leg swelling.   Gastrointestinal: Positive for abdominal pain (GERD controlled with prn ppi). Negative for anal bleeding, constipation, diarrhea, nausea, rectal pain and vomiting.   Endocrine: Negative for cold intolerance, heat intolerance and polyuria.   Genitourinary: Negative for difficulty urinating, dysuria, flank pain, frequency, hematuria and urgency.   Musculoskeletal: Negative for arthralgias, back pain and myalgias.   Skin: Negative for rash.   Allergic/Immunologic: Positive for environmental allergies.   Neurological: Positive for headaches. Negative for dizziness, syncope, speech difficulty, weakness, light-headedness and numbness.   Hematological: Does not bruise/bleed easily.   Psychiatric/Behavioral: Positive for dysphoric mood. Negative for agitation, confusion and sleep disturbance. The patient is nervous/anxious.        Objective   Vitals:    06/11/19 1033   BP: 110/68   Pulse: 76   Temp: 98.4 °F (36.9 °C)   SpO2: 98%          06/11/19  1033   Weight: 64 kg (141 lb)    [unfilled]  Body mass index is 22.08 kg/m².      Physical Exam   Constitutional: She is oriented to person, place, and time. She appears well-developed and well-nourished.   HENT:   Head: Normocephalic and atraumatic.   Right Ear: External ear normal.   Left Ear: External ear normal.   Nose: Nose normal.   Mouth/Throat: Oropharynx is clear and moist.   Eyes: Conjunctivae and EOM are normal. Pupils are equal, round, and reactive to light.   Neck: Normal range of motion. Neck supple.  No JVD present. No thyromegaly present.   Cardiovascular: Normal rate, regular rhythm, normal heart sounds and intact distal pulses. Exam reveals no gallop.   No murmur heard.  Pulmonary/Chest: Effort normal and breath sounds normal. No respiratory distress. She has no wheezes. She has no rales.   Abdominal: Soft. Bowel sounds are normal. She exhibits no distension and no mass. There is no tenderness. There is no guarding. No hernia.   Musculoskeletal: Normal range of motion. She exhibits no edema.   Lymphadenopathy:     She has no cervical adenopathy.   Neurological: She is alert and oriented to person, place, and time. She displays normal reflexes. No cranial nerve deficit. Coordination normal.   Skin: Skin is warm and dry.   Psychiatric: She has a normal mood and affect. Her behavior is normal. Judgment and thought content normal.   Nursing note and vitals reviewed.        Problem List Items Addressed This Visit        Other    Anxiety, generalized    Relevant Orders    Compliance Drug Analysis, Ur - Urine, Clean Catch      Other Visit Diagnoses     Encounter for preventive health examination    -  Primary    Relevant Orders    POCT urinalysis dipstick, automated (Completed)    Long term use of drug        Relevant Orders    Compliance Drug Analysis, Ur - Urine, Clean Catch        Assessment/Plan   In for annual preventative exam and check of hyperlipidemia.  She has GERD but that's more related to stress than anything.  This responded to PPIs however.  Known gallstones.  Allergic rhinitis.  Chronic anxiety and depression.  Recheck lipids every 6 months.  Annual lab work including CBC, CMP, and lipids and November 2018.  Annual preventive exam today June 2019.  Tobacco is down to 1 cigarette every 1 to 2 weeks.  Has a skin fungus.  Plans ketoconazole 200 mg, 2 tablets once weekly for 4 weeks.  She will stop her Lipitor during that period of time.         Dragon disclaimer:   Much of this encounter note is an  electronic transcription/translation of spoken language to printed text. The electronic translation of spoken language may permit erroneous, or at times, nonsensical words or phrases to be inadvertently transcribed; Although I have reviewed the note for such errors, some may still exist.

## 2019-06-16 LAB — DRUGS UR: NORMAL

## 2019-06-17 RX ORDER — SUMATRIPTAN 50 MG/1
TABLET, FILM COATED ORAL
Qty: 9 TABLET | Refills: 0 | Status: SHIPPED | OUTPATIENT
Start: 2019-06-17 | End: 2019-09-24 | Stop reason: SDUPTHER

## 2019-06-17 RX ORDER — ALPRAZOLAM 0.5 MG/1
TABLET ORAL
Qty: 30 TABLET | Refills: 0 | OUTPATIENT
Start: 2019-06-17 | End: 2019-09-24 | Stop reason: SDUPTHER

## 2019-06-18 ENCOUNTER — TELEPHONE (OUTPATIENT)
Dept: INTERNAL MEDICINE | Facility: CLINIC | Age: 34
End: 2019-06-18

## 2019-06-18 NOTE — TELEPHONE ENCOUNTER
Pt wants to clarify that she was fasting for labs Lipid panel on 6/11/19 & she is concerned about lipid results, specifically triglycerides. Please advise.    Okay.  Yes the triglycerides were elevated.  This is the first time they have been elevated.  She does have a marked family history of triglyceride problems but hers have never been up so I am not too worried.  Traditionally sweets and alcohol will raise triglycerides as well as the nonfasting state.  These were not in a dangerous range for a range that we need to treat.

## 2019-08-30 RX ORDER — ATORVASTATIN CALCIUM 40 MG/1
TABLET, FILM COATED ORAL
Qty: 90 TABLET | Refills: 0 | Status: SHIPPED | OUTPATIENT
Start: 2019-08-30 | End: 2020-04-10

## 2019-08-30 RX ORDER — PAROXETINE HYDROCHLORIDE 20 MG/1
TABLET, FILM COATED ORAL
Qty: 90 TABLET | Refills: 0 | Status: SHIPPED | OUTPATIENT
Start: 2019-08-30 | End: 2020-02-19

## 2019-09-24 RX ORDER — ALPRAZOLAM 0.5 MG/1
TABLET ORAL
Qty: 30 TABLET | Refills: 0 | OUTPATIENT
Start: 2019-09-24 | End: 2019-11-19 | Stop reason: SDUPTHER

## 2019-09-24 RX ORDER — SUMATRIPTAN 50 MG/1
TABLET, FILM COATED ORAL
Qty: 9 TABLET | Refills: 0 | Status: SHIPPED | OUTPATIENT
Start: 2019-09-24 | End: 2020-05-04

## 2019-11-19 RX ORDER — ALPRAZOLAM 0.5 MG/1
0.5 TABLET ORAL DAILY PRN
Qty: 30 TABLET | Refills: 0 | OUTPATIENT
Start: 2019-11-19 | End: 2020-02-10

## 2019-12-12 ENCOUNTER — OFFICE VISIT (OUTPATIENT)
Dept: INTERNAL MEDICINE | Facility: CLINIC | Age: 34
End: 2019-12-12

## 2019-12-12 VITALS
HEART RATE: 81 BPM | DIASTOLIC BLOOD PRESSURE: 62 MMHG | WEIGHT: 142.6 LBS | RESPIRATION RATE: 16 BRPM | HEIGHT: 67 IN | SYSTOLIC BLOOD PRESSURE: 110 MMHG | OXYGEN SATURATION: 99 % | BODY MASS INDEX: 22.38 KG/M2 | TEMPERATURE: 98.3 F

## 2019-12-12 DIAGNOSIS — K21.9 GASTROESOPHAGEAL REFLUX DISEASE, ESOPHAGITIS PRESENCE NOT SPECIFIED: ICD-10-CM

## 2019-12-12 DIAGNOSIS — Z79.899 MEDICATION MANAGEMENT: ICD-10-CM

## 2019-12-12 DIAGNOSIS — F41.1 ANXIETY, GENERALIZED: ICD-10-CM

## 2019-12-12 DIAGNOSIS — E78.5 HYPERLIPIDEMIA, UNSPECIFIED HYPERLIPIDEMIA TYPE: Primary | ICD-10-CM

## 2019-12-12 PROCEDURE — 99213 OFFICE O/P EST LOW 20 MIN: CPT | Performed by: INTERNAL MEDICINE

## 2019-12-12 NOTE — PROGRESS NOTES
Subjective   Erinn Robert is a 34 y.o. female.     Chief Complaint   Patient presents with   • Depression   • Anxiety   • Hyperlipidemia         Depression Patient presents with the following symptoms: nervousness/anxiety.  Patient is not experiencing: shortness of breath.    Anxiety   Presents for follow-up visit. Symptoms include nervous/anxious behavior. Patient reports no chest pain or shortness of breath. Symptoms occur occasionally.     Her past medical history is significant for depression.   Hyperlipidemia   This is a chronic problem. The current episode started more than 1 year ago. The problem is controlled. Recent lipid tests were reviewed and are normal. There are no known factors aggravating her hyperlipidemia. Pertinent negatives include no chest pain or shortness of breath. Current antihyperlipidemic treatment includes statins. The current treatment provides significant improvement of lipids. There are no compliance problems.  Risk factors for coronary artery disease include dyslipidemia.   Heartburn   She complains of heartburn. She reports no abdominal pain, no chest pain, no coughing or no wheezing. This is a chronic problem. The current episode started more than 1 year ago. The problem occurs occasionally. The problem has been unchanged. Pertinent negatives include no fatigue. She has tried a PPI for the symptoms. The treatment provided moderate relief.        The following portions of the patient's history were reviewed and updated as appropriate: allergies, current medications, past social history and problem list.    Outpatient Medications Marked as Taking for the 12/12/19 encounter (Office Visit) with Karri Jones MD   Medication Sig Dispense Refill   • ALPRAZolam (XANAX) 0.5 MG tablet Take 1 tablet by mouth Daily As Needed for Anxiety. 30 tablet 0   • atorvastatin (LIPITOR) 40 MG tablet TAKE ONE TABLET BY MOUTH DAILY 90 tablet 0   • Omega-3 Fatty Acids (FISH OIL) 1000 MG capsule capsule  Take 1,000 mg by mouth Daily With Breakfast.     • PARoxetine (PAXIL) 20 MG tablet TAKE ONE TABLET BY MOUTH DAILY 90 tablet 0   • SUMAtriptan (IMITREX) 50 MG tablet TAKE ONE TABLET BY MOUTH DAILY AS NEEDED 9 tablet 0   • [DISCONTINUED] ECOZA 1 % foam      • [DISCONTINUED] SPRINTEC 28 0.25-35 MG-MCG per tablet Take 1 tablet by mouth Daily.         Review of Systems   Constitutional: Negative for fatigue.   Respiratory: Negative for cough, shortness of breath and wheezing.    Cardiovascular: Negative for chest pain.   Gastrointestinal: Positive for heartburn. Negative for abdominal pain.   Psychiatric/Behavioral: The patient is nervous/anxious.        Objective   Vitals:    12/12/19 0854   BP: 110/62   Pulse: 81   Resp: 16   Temp: 98.3 °F (36.8 °C)   SpO2: 99%          12/12/19  0854   Weight: 64.7 kg (142 lb 9.6 oz)    [unfilled]  Body mass index is 22.33 kg/m².      Physical Exam   Constitutional: She appears well-developed and well-nourished. No distress.   HENT:   Head: Normocephalic and atraumatic.   Neck: Carotid bruit is not present. No thyromegaly present.   Cardiovascular: Normal rate, regular rhythm, normal heart sounds and intact distal pulses. Exam reveals no gallop.   No murmur heard.  Pulmonary/Chest: Effort normal and breath sounds normal. No respiratory distress. She has no wheezes. She has no rales.   Abdominal: Soft. Bowel sounds are normal. She exhibits no mass. There is no tenderness. There is no guarding.         Problem List Items Addressed This Visit        Cardiovascular and Mediastinum    Hyperlipidemia - Primary       Digestive    GERD (gastroesophageal reflux disease)       Other    Anxiety, generalized        Assessment/Plan   In for check of hyperlipidemia.  She has GERD in the past but none recently and on no treatment.  That's more related to stress than anything.  This responded PPIs however.  Known gallstones.  Recheck lipids q 6 mos.  Annual lab work today December 2019 and annual  preventative exam June 2020.  Declines flu shots.  She is fasting today.         Dragon disclaimer:   Much of this encounter note is an electronic transcription/translation of spoken language to printed text. The electronic translation of spoken language may permit erroneous, or at times, nonsensical words or phrases to be inadvertently transcribed; Although I have reviewed the note for such errors, some may still exist.

## 2019-12-13 LAB
ALBUMIN SERPL-MCNC: 5 G/DL (ref 3.5–5.2)
ALBUMIN/GLOB SERPL: 2.6 G/DL
ALP SERPL-CCNC: 57 U/L (ref 39–117)
ALT SERPL-CCNC: 27 U/L (ref 1–33)
AST SERPL-CCNC: 28 U/L (ref 1–32)
BASOPHILS # BLD AUTO: 0.03 10*3/MM3 (ref 0–0.2)
BASOPHILS NFR BLD AUTO: 0.6 % (ref 0–1.5)
BILIRUB SERPL-MCNC: 0.4 MG/DL (ref 0.2–1.2)
BUN SERPL-MCNC: 11 MG/DL (ref 6–20)
BUN/CREAT SERPL: 15.3 (ref 7–25)
CALCIUM SERPL-MCNC: 9.5 MG/DL (ref 8.6–10.5)
CHLORIDE SERPL-SCNC: 101 MMOL/L (ref 98–107)
CHOLEST SERPL-MCNC: 146 MG/DL (ref 0–200)
CO2 SERPL-SCNC: 28.4 MMOL/L (ref 22–29)
CREAT SERPL-MCNC: 0.72 MG/DL (ref 0.57–1)
EOSINOPHIL # BLD AUTO: 0.07 10*3/MM3 (ref 0–0.4)
EOSINOPHIL NFR BLD AUTO: 1.5 % (ref 0.3–6.2)
ERYTHROCYTE [DISTWIDTH] IN BLOOD BY AUTOMATED COUNT: 12.4 % (ref 12.3–15.4)
GLOBULIN SER CALC-MCNC: 1.9 GM/DL
GLUCOSE SERPL-MCNC: 87 MG/DL (ref 65–99)
HCT VFR BLD AUTO: 40.3 % (ref 34–46.6)
HDLC SERPL-MCNC: 82 MG/DL (ref 40–60)
HGB BLD-MCNC: 13.5 G/DL (ref 12–15.9)
IMM GRANULOCYTES # BLD AUTO: 0.01 10*3/MM3 (ref 0–0.05)
IMM GRANULOCYTES NFR BLD AUTO: 0.2 % (ref 0–0.5)
LDLC SERPL CALC-MCNC: 56 MG/DL (ref 0–100)
LYMPHOCYTES # BLD AUTO: 2.05 10*3/MM3 (ref 0.7–3.1)
LYMPHOCYTES NFR BLD AUTO: 44.4 % (ref 19.6–45.3)
MCH RBC QN AUTO: 32.4 PG (ref 26.6–33)
MCHC RBC AUTO-ENTMCNC: 33.5 G/DL (ref 31.5–35.7)
MCV RBC AUTO: 96.6 FL (ref 79–97)
MONOCYTES # BLD AUTO: 0.38 10*3/MM3 (ref 0.1–0.9)
MONOCYTES NFR BLD AUTO: 8.2 % (ref 5–12)
NEUTROPHILS # BLD AUTO: 2.08 10*3/MM3 (ref 1.7–7)
NEUTROPHILS NFR BLD AUTO: 45.1 % (ref 42.7–76)
NRBC BLD AUTO-RTO: 0 /100 WBC (ref 0–0.2)
PLATELET # BLD AUTO: 200 10*3/MM3 (ref 140–450)
POTASSIUM SERPL-SCNC: 4.3 MMOL/L (ref 3.5–5.2)
PROT SERPL-MCNC: 6.9 G/DL (ref 6–8.5)
RBC # BLD AUTO: 4.17 10*6/MM3 (ref 3.77–5.28)
SODIUM SERPL-SCNC: 141 MMOL/L (ref 136–145)
TRIGL SERPL-MCNC: 40 MG/DL (ref 0–150)
VLDLC SERPL CALC-MCNC: 8 MG/DL
WBC # BLD AUTO: 4.62 10*3/MM3 (ref 3.4–10.8)

## 2020-01-06 ENCOUNTER — TELEPHONE (OUTPATIENT)
Dept: INTERNAL MEDICINE | Facility: CLINIC | Age: 35
End: 2020-01-06

## 2020-01-06 NOTE — TELEPHONE ENCOUNTER
Pt c/o being around a friend for 5 days, who was recently diagnosed with the flu. Pt has no symptoms but going out of town tomorrow for work & wonders if she needs to do/take anything to prevent getting the flu. Please advise.    Typically we do not recommend prophylaxis for healthy people in her age group.  The problem is that there is frequent exposure everywhere and a few days of prophylaxis now will not obviate other exposures.  Have her call me if she gets sick in the next few days.

## 2020-02-10 RX ORDER — ALPRAZOLAM 0.5 MG/1
TABLET ORAL
Qty: 90 TABLET | Refills: 0 | OUTPATIENT
Start: 2020-02-10 | End: 2020-05-28

## 2020-02-19 RX ORDER — PAROXETINE HYDROCHLORIDE 20 MG/1
TABLET, FILM COATED ORAL
Qty: 90 TABLET | Refills: 1 | Status: SHIPPED | OUTPATIENT
Start: 2020-02-19 | End: 2020-08-17

## 2020-04-10 RX ORDER — ATORVASTATIN CALCIUM 40 MG/1
TABLET, FILM COATED ORAL
Qty: 90 TABLET | Refills: 1 | Status: SHIPPED | OUTPATIENT
Start: 2020-04-10 | End: 2021-03-22

## 2020-05-04 RX ORDER — SUMATRIPTAN 50 MG/1
TABLET, FILM COATED ORAL
Qty: 9 TABLET | Refills: 0 | Status: SHIPPED | OUTPATIENT
Start: 2020-05-04 | End: 2020-07-06

## 2020-05-28 RX ORDER — ALPRAZOLAM 0.5 MG/1
TABLET ORAL
Qty: 90 TABLET | Refills: 0 | OUTPATIENT
Start: 2020-05-28 | End: 2021-02-05

## 2020-07-06 RX ORDER — SUMATRIPTAN 50 MG/1
TABLET, FILM COATED ORAL
Qty: 9 TABLET | Refills: 0 | Status: SHIPPED | OUTPATIENT
Start: 2020-07-06 | End: 2020-11-25

## 2020-08-17 RX ORDER — PAROXETINE HYDROCHLORIDE 20 MG/1
TABLET, FILM COATED ORAL
Qty: 90 TABLET | Refills: 1 | Status: SHIPPED | OUTPATIENT
Start: 2020-08-17 | End: 2021-03-26

## 2020-11-19 ENCOUNTER — PATIENT MESSAGE (OUTPATIENT)
Dept: INTERNAL MEDICINE | Facility: CLINIC | Age: 35
End: 2020-11-19

## 2020-11-20 ENCOUNTER — TELEPHONE (OUTPATIENT)
Dept: INTERNAL MEDICINE | Facility: CLINIC | Age: 35
End: 2020-11-20

## 2020-11-20 RX ORDER — ACETAZOLAMIDE 500 MG/1
500 CAPSULE, EXTENDED RELEASE ORAL 2 TIMES DAILY
Qty: 18 CAPSULE | Refills: 0 | Status: SHIPPED | OUTPATIENT
Start: 2020-11-20 | End: 2020-11-28

## 2020-11-20 RX ORDER — CIPROFLOXACIN 500 MG/1
500 TABLET, FILM COATED ORAL 2 TIMES DAILY PRN
Qty: 14 TABLET | Refills: 0 | Status: SHIPPED | OUTPATIENT
Start: 2020-11-20 | End: 2021-03-26

## 2020-11-20 NOTE — TELEPHONE ENCOUNTER
I would recommend Diamox sequels.  500 mg twice daily.  Start 1 day prior to his sent.  Continue them for 2 to 3 days after hitting the [place] or upon descent.  The number she will need to be depend on the how long it will take [place].  The drug can actually be taken once or twice daily so at her age she might be fine with once a day.  I am not aware of any specific immunizations other than to make sure her tetanus is up-to-date which it is.  He was last given in 2012.  Also her flu shot should be up-to-date if she has not gotten one yet.  They take 2 weeks to work so she will not have full coverage even if she takes 1 now but she should.  She would have to check on the Internet or with her  as to whether any specific immunizations are required for [place].      ----- Message from Terri Gomez MA sent at 11/19/2020  4:15 PM EST -----  Regarding: FW: Prescription Question  Contact: [phone]  Please advise  ----- Message -----  From: Erinn Robert  Sent: 11/19/2020   3:16 PM EST  To: Terrell Russ [facility]  Subject: Prescription Question                            Hello,     I'm traveling to [place] next week to hike in the [place] and it's been recommended that I obtain altitude medication from my doctor. I'm also wondering if you recommend any quick vaccines?    Thanks,    Erinn Robert  [phone]

## 2020-11-25 RX ORDER — SUMATRIPTAN 50 MG/1
TABLET, FILM COATED ORAL
Qty: 9 TABLET | Refills: 1 | Status: SHIPPED | OUTPATIENT
Start: 2020-11-25 | End: 2021-02-22

## 2020-11-28 RX ORDER — ACETAZOLAMIDE 500 MG/1
CAPSULE, EXTENDED RELEASE ORAL
Qty: 18 CAPSULE | Refills: 0 | Status: SHIPPED | OUTPATIENT
Start: 2020-11-28 | End: 2021-03-26

## 2021-02-05 RX ORDER — ALPRAZOLAM 0.5 MG/1
TABLET ORAL
Qty: 90 TABLET | Refills: 0 | Status: SHIPPED | OUTPATIENT
Start: 2021-02-05

## 2021-02-22 RX ORDER — SUMATRIPTAN 50 MG/1
TABLET, FILM COATED ORAL
Qty: 9 TABLET | Refills: 0 | Status: SHIPPED | OUTPATIENT
Start: 2021-02-22 | End: 2021-05-17

## 2021-03-22 RX ORDER — ATORVASTATIN CALCIUM 40 MG/1
TABLET, FILM COATED ORAL
Qty: 90 TABLET | Refills: 1 | Status: SHIPPED | OUTPATIENT
Start: 2021-03-22 | End: 2021-10-04

## 2021-03-26 ENCOUNTER — OFFICE VISIT (OUTPATIENT)
Dept: INTERNAL MEDICINE | Facility: CLINIC | Age: 36
End: 2021-03-26

## 2021-03-26 VITALS
DIASTOLIC BLOOD PRESSURE: 66 MMHG | RESPIRATION RATE: 18 BRPM | HEIGHT: 67 IN | HEART RATE: 81 BPM | TEMPERATURE: 97.3 F | BODY MASS INDEX: 22.44 KG/M2 | WEIGHT: 143 LBS | SYSTOLIC BLOOD PRESSURE: 122 MMHG | OXYGEN SATURATION: 98 %

## 2021-03-26 DIAGNOSIS — Z00.00 ENCOUNTER FOR PREVENTIVE HEALTH EXAMINATION: Primary | ICD-10-CM

## 2021-03-26 DIAGNOSIS — F41.1 ANXIETY, GENERALIZED: ICD-10-CM

## 2021-03-26 DIAGNOSIS — E78.5 HYPERLIPIDEMIA, UNSPECIFIED HYPERLIPIDEMIA TYPE: ICD-10-CM

## 2021-03-26 DIAGNOSIS — Z11.59 NEED FOR HEPATITIS C SCREENING TEST: ICD-10-CM

## 2021-03-26 DIAGNOSIS — K21.9 GASTROESOPHAGEAL REFLUX DISEASE, UNSPECIFIED WHETHER ESOPHAGITIS PRESENT: ICD-10-CM

## 2021-03-26 LAB
PH UR: 6 [PH] (ref 5–8)
PROT UR STRIP-MCNC: NEGATIVE MG/DL
RBC # UR STRIP: NEGATIVE /UL
SP GR UR: 1.02 (ref 1–1.03)

## 2021-03-26 PROCEDURE — 99395 PREV VISIT EST AGE 18-39: CPT | Performed by: INTERNAL MEDICINE

## 2021-03-26 PROCEDURE — 81003 URINALYSIS AUTO W/O SCOPE: CPT | Performed by: INTERNAL MEDICINE

## 2021-03-26 RX ORDER — PAROXETINE HYDROCHLORIDE 20 MG/1
TABLET, FILM COATED ORAL
Qty: 90 TABLET | Refills: 1 | Status: SHIPPED | OUTPATIENT
Start: 2021-03-26 | End: 2021-11-12 | Stop reason: DRUGHIGH

## 2021-03-26 RX ORDER — CRISABOROLE 20 MG/G
OINTMENT TOPICAL
COMMUNITY
Start: 2020-12-31 | End: 2021-11-12

## 2021-03-26 RX ORDER — DAPSONE 75 MG/G
GEL TOPICAL
COMMUNITY
Start: 2021-03-16 | End: 2021-11-12

## 2021-03-26 NOTE — PROGRESS NOTES
Subjective   Erinn Robert is a 36 y.o. female.     Chief Complaint   Patient presents with   • Annual Exam         In for annual preventative exam.  Sleep is good.  Gets 8-10 hours at night.  Exercises daily for 1 hour with yoga.  Energy is good.  Diet is well-balanced.         The following portions of the patient's history were reviewed and updated as appropriate: allergies, current medications, past social history and problem list.    HISTORY  Outpatient Medications Marked as Taking for the 3/26/21 encounter (Office Visit) with Karri Jones MD   Medication Sig Dispense Refill   • Aczone 7.5 % gel      • ALPRAZolam (XANAX) 0.5 MG tablet TAKE ONE TABLET BY MOUTH DAILY AS NEEDED FOR ANXIETY 90 tablet 0   • atorvastatin (LIPITOR) 40 MG tablet TAKE ONE TABLET BY MOUTH DAILY 90 tablet 1   • Eucrisa 2 % ointment      • PARoxetine (PAXIL) 20 MG tablet TAKE ONE TABLET BY MOUTH DAILY 90 tablet 1   • SUMAtriptan (IMITREX) 50 MG tablet TAKE ONE TABLET BY MOUTH DAILY AS NEEDED 9 tablet 0     Social History     Socioeconomic History   • Marital status: Single     Spouse name: Not on file   • Number of children: Not on file   • Years of education: Not on file   • Highest education level: Not on file   Tobacco Use   • Smoking status: Current Some Day Smoker     Types: Cigarettes     Start date: 2006   • Smokeless tobacco: Never Used   • Tobacco comment: 10 CIGARETTES X WEEK   Substance and Sexual Activity   • Alcohol use: Yes     Alcohol/week: 8.0 standard drinks     Types: 8 Glasses of wine per week     Comment: WIne: 8 glasses x week     Family History   Problem Relation Age of Onset   • No Known Problems Mother    • No Known Problems Father    • No Known Problems Brother    • No Known Problems Brother      History reviewed. No pertinent past medical history.  Past Surgical History:   Procedure Laterality Date   • WISDOM TOOTH EXTRACTION         Review of Systems   Constitutional: Negative for appetite change, chills,  diaphoresis, fatigue, fever and unexpected weight change.   HENT: Negative for congestion, ear pain, hearing loss, nosebleeds, postnasal drip, rhinorrhea, sinus pressure and trouble swallowing.    Eyes: Negative for pain, itching and visual disturbance.   Respiratory: Negative for cough, chest tightness, shortness of breath and wheezing.    Cardiovascular: Positive for palpitations ( With increased stress and caffeine). Negative for chest pain and leg swelling.   Gastrointestinal: Negative for abdominal pain, anal bleeding, constipation, diarrhea, nausea, rectal pain and vomiting.   Endocrine: Negative for cold intolerance, heat intolerance and polyuria.   Genitourinary: Negative for difficulty urinating, dysuria, flank pain, frequency, hematuria and urgency.   Musculoskeletal: Negative for arthralgias, back pain and myalgias.   Skin: Negative for rash.   Allergic/Immunologic: Positive for environmental allergies.   Neurological: Positive for headaches. Negative for dizziness, syncope, speech difficulty, weakness, light-headedness and numbness.   Hematological: Does not bruise/bleed easily.   Psychiatric/Behavioral: Positive for dysphoric mood. Negative for agitation, confusion and sleep disturbance. The patient is nervous/anxious.        Objective   Vitals:    03/26/21 1339   BP: 122/66   Pulse: 81   Resp: 18   Temp: 97.3 °F (36.3 °C)   SpO2: 98%          03/26/21  1339   Weight: 64.9 kg (143 lb)    [unfilled]  Body mass index is 22.4 kg/m².      Physical Exam   Constitutional: She is oriented to person, place, and time. She appears well-developed.   HENT:   Head: Normocephalic and atraumatic.   Right Ear: External ear normal.   Left Ear: External ear normal.   Nose: Nose normal.   Eyes: Pupils are equal, round, and reactive to light. Conjunctivae are normal.   Neck: No JVD present. No thyromegaly present.   Cardiovascular: Normal rate, regular rhythm and normal heart sounds. Exam reveals no gallop.   No murmur  heard.  Pulmonary/Chest: Effort normal and breath sounds normal. No respiratory distress. She has no wheezes. She has no rales.   Abdominal: Soft. Bowel sounds are normal. She exhibits no distension and no mass. There is no abdominal tenderness. There is no guarding. No hernia.   Musculoskeletal: Normal range of motion.   Lymphadenopathy:     She has no cervical adenopathy.   Neurological: She is alert and oriented to person, place, and time. She displays normal reflexes. No cranial nerve deficit. Coordination normal.   Skin: Skin is warm and dry.   Psychiatric: Her behavior is normal. Judgment and thought content normal.   Nursing note and vitals reviewed.        Problems Addressed this Visit        Cardiac and Vasculature    Hyperlipidemia       Gastrointestinal Abdominal     GERD (gastroesophageal reflux disease)       Mental Health    Anxiety, generalized      Other Visit Diagnoses     Encounter for preventive health examination    -  Primary    Relevant Orders    POC Urinalysis Dipstick, Automated      Diagnoses       Codes Comments    Encounter for preventive health examination    -  Primary ICD-10-CM: Z00.00  ICD-9-CM: V70.0     Hyperlipidemia, unspecified hyperlipidemia type     ICD-10-CM: E78.5  ICD-9-CM: 272.4     Gastroesophageal reflux disease, unspecified whether esophagitis present     ICD-10-CM: K21.9  ICD-9-CM: 530.81     Anxiety, generalized     ICD-10-CM: F41.1  ICD-9-CM: 300.02         Assessment/Plan   In for annual preventative exam and check of hyperlipidemia.  She has GERD but that's more related to stress than anything.  This responded to PPIs however.  Known gallstones.  Allergic rhinitis.  Chronic anxiety and depression.  Recheck lipids every 6 months.  Annual lab work including CBC, CMP, lipids, hep C antibody and UA today March 2021.  Annual preventive exam today March 2021.  Tobacco is down to 1 cigarette every 1 to 2 weeks.  She is fasting today.    Prevention counseling was performed  today. The counseling performed was routine health maintenance topics.  The above information was reviewed again today 03/26/21.  It continues to be accurate as reflected above and is unchanged.  History, physical and review of systems all reviewed and are unchanged.  Medications were reviewed today and continue the current dosing.    PPE today includes face mask and eye shield.         Dragon disclaimer:   Much of this encounter note is an electronic transcription/translation of spoken language to printed text. The electronic translation of spoken language may permit erroneous, or at times, nonsensical words or phrases to be inadvertently transcribed; Although I have reviewed the note for such errors, some may still exist.

## 2021-03-27 LAB
ALBUMIN SERPL-MCNC: 4.7 G/DL (ref 3.5–5.2)
ALBUMIN/GLOB SERPL: 2 G/DL
ALP SERPL-CCNC: 73 U/L (ref 39–117)
ALT SERPL-CCNC: 27 U/L (ref 1–33)
AST SERPL-CCNC: 23 U/L (ref 1–32)
BASOPHILS # BLD AUTO: 0.03 10*3/MM3 (ref 0–0.2)
BASOPHILS NFR BLD AUTO: 0.6 % (ref 0–1.5)
BILIRUB SERPL-MCNC: 0.6 MG/DL (ref 0–1.2)
BUN SERPL-MCNC: 10 MG/DL (ref 6–20)
BUN/CREAT SERPL: 12.8 (ref 7–25)
CALCIUM SERPL-MCNC: 9.5 MG/DL (ref 8.6–10.5)
CHLORIDE SERPL-SCNC: 102 MMOL/L (ref 98–107)
CHOLEST SERPL-MCNC: 192 MG/DL (ref 0–200)
CHOLEST/HDLC SERPL: 2.34 {RATIO}
CO2 SERPL-SCNC: 26.2 MMOL/L (ref 22–29)
CREAT SERPL-MCNC: 0.78 MG/DL (ref 0.57–1)
EOSINOPHIL # BLD AUTO: 0.06 10*3/MM3 (ref 0–0.4)
EOSINOPHIL NFR BLD AUTO: 1.1 % (ref 0.3–6.2)
ERYTHROCYTE [DISTWIDTH] IN BLOOD BY AUTOMATED COUNT: 12.4 % (ref 12.3–15.4)
GLOBULIN SER CALC-MCNC: 2.3 GM/DL
GLUCOSE SERPL-MCNC: 88 MG/DL (ref 65–99)
HCT VFR BLD AUTO: 41.7 % (ref 34–46.6)
HCV AB S/CO SERPL IA: <0.1 S/CO RATIO (ref 0–0.9)
HDLC SERPL-MCNC: 82 MG/DL (ref 40–60)
HGB BLD-MCNC: 14 G/DL (ref 12–15.9)
IMM GRANULOCYTES # BLD AUTO: 0.01 10*3/MM3 (ref 0–0.05)
IMM GRANULOCYTES NFR BLD AUTO: 0.2 % (ref 0–0.5)
LDLC SERPL CALC-MCNC: 96 MG/DL (ref 0–100)
LYMPHOCYTES # BLD AUTO: 2.58 10*3/MM3 (ref 0.7–3.1)
LYMPHOCYTES NFR BLD AUTO: 47.4 % (ref 19.6–45.3)
MCH RBC QN AUTO: 32 PG (ref 26.6–33)
MCHC RBC AUTO-ENTMCNC: 33.6 G/DL (ref 31.5–35.7)
MCV RBC AUTO: 95.2 FL (ref 79–97)
MONOCYTES # BLD AUTO: 0.35 10*3/MM3 (ref 0.1–0.9)
MONOCYTES NFR BLD AUTO: 6.4 % (ref 5–12)
NEUTROPHILS # BLD AUTO: 2.41 10*3/MM3 (ref 1.7–7)
NEUTROPHILS NFR BLD AUTO: 44.3 % (ref 42.7–76)
NRBC BLD AUTO-RTO: 0 /100 WBC (ref 0–0.2)
PLATELET # BLD AUTO: 277 10*3/MM3 (ref 140–450)
POTASSIUM SERPL-SCNC: 4.1 MMOL/L (ref 3.5–5.2)
PROT SERPL-MCNC: 7 G/DL (ref 6–8.5)
RBC # BLD AUTO: 4.38 10*6/MM3 (ref 3.77–5.28)
SODIUM SERPL-SCNC: 138 MMOL/L (ref 136–145)
TRIGL SERPL-MCNC: 79 MG/DL (ref 0–150)
VLDLC SERPL CALC-MCNC: 14 MG/DL (ref 5–40)
WBC # BLD AUTO: 5.44 10*3/MM3 (ref 3.4–10.8)

## 2021-05-17 RX ORDER — SUMATRIPTAN 50 MG/1
TABLET, FILM COATED ORAL
Qty: 9 TABLET | Refills: 0 | Status: SHIPPED | OUTPATIENT
Start: 2021-05-17 | End: 2021-08-13

## 2021-08-13 RX ORDER — SUMATRIPTAN 50 MG/1
TABLET, FILM COATED ORAL
Qty: 9 TABLET | Refills: 0 | Status: SHIPPED | OUTPATIENT
Start: 2021-08-13 | End: 2021-11-16

## 2021-10-04 RX ORDER — ATORVASTATIN CALCIUM 40 MG/1
TABLET, FILM COATED ORAL
Qty: 90 TABLET | Refills: 1 | Status: SHIPPED | OUTPATIENT
Start: 2021-10-04 | End: 2022-04-18

## 2021-11-12 ENCOUNTER — OFFICE VISIT (OUTPATIENT)
Dept: INTERNAL MEDICINE | Facility: CLINIC | Age: 36
End: 2021-11-12

## 2021-11-12 VITALS
SYSTOLIC BLOOD PRESSURE: 94 MMHG | RESPIRATION RATE: 16 BRPM | WEIGHT: 145 LBS | OXYGEN SATURATION: 98 % | DIASTOLIC BLOOD PRESSURE: 62 MMHG | HEIGHT: 67 IN | HEART RATE: 105 BPM | BODY MASS INDEX: 22.76 KG/M2 | TEMPERATURE: 98 F

## 2021-11-12 DIAGNOSIS — Z79.899 LONG TERM USE OF DRUG: ICD-10-CM

## 2021-11-12 DIAGNOSIS — E78.5 HYPERLIPIDEMIA, UNSPECIFIED HYPERLIPIDEMIA TYPE: ICD-10-CM

## 2021-11-12 DIAGNOSIS — F41.1 ANXIETY, GENERALIZED: Primary | ICD-10-CM

## 2021-11-12 PROCEDURE — 99213 OFFICE O/P EST LOW 20 MIN: CPT | Performed by: INTERNAL MEDICINE

## 2021-11-12 RX ORDER — PAROXETINE HYDROCHLORIDE 40 MG/1
40 TABLET, FILM COATED ORAL DAILY
COMMUNITY
Start: 2021-10-13

## 2021-11-12 RX ORDER — CHLORAL HYDRATE 500 MG
1000 CAPSULE ORAL
COMMUNITY

## 2021-11-12 NOTE — PROGRESS NOTES
Subjective   Erinn Robert is a 36 y.o. female.     Chief Complaint   Patient presents with   • Anxiety   • Depression   • Rapid Heart Rate   • Ear Fullness   • Hyperlipidemia         Anxiety  Presents for follow-up visit. Symptoms include nervous/anxious behavior and palpitations. Patient reports no chest pain or shortness of breath. Symptoms occur occasionally.     Her past medical history is significant for depression.   Depression  Visit Type: follow-up  Patient presents with the following symptoms: nervousness/anxiety and palpitations.  Patient is not experiencing: shortness of breath.    Ear Fullness   There is pain in the right ear. This is a new problem. The current episode started in the past 7 days. Associated symptoms include hearing loss.   Hyperlipidemia  This is a chronic problem. The current episode started more than 1 year ago. The problem is controlled. Recent lipid tests were reviewed and are normal. There are no known factors aggravating her hyperlipidemia. Pertinent negatives include no chest pain or shortness of breath. Current antihyperlipidemic treatment includes statins.   Heartburn  She complains of heartburn. She reports no chest pain or no wheezing. This is a chronic problem. The current episode started more than 1 year ago. The problem occurs occasionally. The problem has been unchanged. She has tried a PPI for the symptoms. The treatment provided moderate relief.        The following portions of the patient's history were reviewed and updated as appropriate: allergies, current medications, past social history and problem list.    Outpatient Medications Marked as Taking for the 11/12/21 encounter (Office Visit) with Karri Jones MD   Medication Sig Dispense Refill   • ALPRAZolam (XANAX) 0.5 MG tablet TAKE ONE TABLET BY MOUTH DAILY AS NEEDED FOR ANXIETY 90 tablet 0   • atorvastatin (LIPITOR) 40 MG tablet TAKE ONE TABLET BY MOUTH DAILY 90 tablet 1   • Omega-3 Fatty Acids (fish oil) 1000  MG capsule capsule Take 1,000 mg by mouth Daily With Breakfast.     • PARoxetine (PAXIL) 40 MG tablet Take 40 mg by mouth Daily.     • SUMAtriptan (IMITREX) 50 MG tablet TAKE ONE TABLET BY MOUTH DAILY AS NEEDED 9 tablet 0       Review of Systems   HENT: Positive for hearing loss.    Respiratory: Negative for shortness of breath and wheezing.    Cardiovascular: Positive for palpitations. Negative for chest pain.   Gastrointestinal: Positive for heartburn.   Psychiatric/Behavioral: The patient is nervous/anxious.        Objective   Vitals:    11/12/21 0834   BP: 94/62   Pulse: 105   Resp: 16   Temp: 98 °F (36.7 °C)   SpO2: 98%          11/12/21 0834   Weight: 65.8 kg (145 lb)    [unfilled]  Body mass index is 22.7 kg/m².      Physical Exam   Constitutional: She appears well-developed.   HENT:   Head: Normocephalic and atraumatic.   Neck: No thyromegaly present.   Cardiovascular: Normal rate, regular rhythm and normal heart sounds. Exam reveals no gallop.   No murmur heard.  Pulmonary/Chest: Effort normal and breath sounds normal. No respiratory distress. She has no wheezes. She has no rales.   Abdominal: Normal appearance.   Neurological: She is alert.         Problems Addressed this Visit        Cardiac and Vasculature    Hyperlipidemia       Mental Health    Anxiety, generalized - Primary    Relevant Medications    PARoxetine (PAXIL) 40 MG tablet      Other Visit Diagnoses     Long term use of drug          Diagnoses       Codes Comments    Anxiety, generalized    -  Primary ICD-10-CM: F41.1  ICD-9-CM: 300.02     Hyperlipidemia, unspecified hyperlipidemia type     ICD-10-CM: E78.5  ICD-9-CM: 272.4     Long term use of drug     ICD-10-CM: Z79.899  ICD-9-CM: V58.69         Assessment/Plan   In for check of hyperlipidemia.  She has GERD in the past but none recently and on no treatment.  That's more related to stress than anything.  This responded PPIs however.  Known gallstones.  She has been having palpitations.   Usually worse during periods of stress and when at work.  Will last anywhere from seconds up to an hour.  Worse at night in bed.  Sounds like possible PSVT.  Advised we could do a Zio patch to try to figure this out since she rarely goes more than a week or 2 without symptoms.  She wants to wait until more stressful.  In her life to help capture the arrhythmia and will put this off 6 months.  Recheck lipids annually at this point.  Annual lab work March 2022 and annual preventative exam March 2022.  Declines flu shots.  She has had some right ear fullness and crackling sounds but the ear exam today is perfect.  This is likely related to allergies and eustachian tube dysfunction.  She is fasting today.    The above information was reviewed again today 11/12/21.  It continues to be accurate as reflected above and is unchanged.  History, physical and review of systems all reviewed and are unchanged.  Medications were reviewed today and continue the current dosing.    PPE today includes face mask and eye shield.           Dragon disclaimer:   Much of this encounter note is an electronic transcription/translation of spoken language to printed text. The electronic translation of spoken language may permit erroneous, or at times, nonsensical words or phrases to be inadvertently transcribed; Although I have reviewed the note for such errors, some may still exist.

## 2021-11-16 RX ORDER — SUMATRIPTAN 50 MG/1
TABLET, FILM COATED ORAL
Qty: 9 TABLET | Refills: 0 | Status: SHIPPED | OUTPATIENT
Start: 2021-11-16 | End: 2022-02-09

## 2022-02-09 RX ORDER — SUMATRIPTAN 50 MG/1
TABLET, FILM COATED ORAL
Qty: 9 TABLET | Refills: 0 | Status: SHIPPED | OUTPATIENT
Start: 2022-02-09 | End: 2022-07-14

## 2022-04-18 RX ORDER — ATORVASTATIN CALCIUM 40 MG/1
TABLET, FILM COATED ORAL
Qty: 90 TABLET | Refills: 1 | Status: SHIPPED | OUTPATIENT
Start: 2022-04-18

## 2022-07-14 RX ORDER — SUMATRIPTAN 50 MG/1
TABLET, FILM COATED ORAL
Qty: 9 TABLET | Refills: 0 | Status: SHIPPED | OUTPATIENT
Start: 2022-07-14 | End: 2022-10-06

## 2022-10-06 RX ORDER — SUMATRIPTAN 50 MG/1
TABLET, FILM COATED ORAL
Qty: 9 TABLET | Refills: 0 | Status: SHIPPED | OUTPATIENT
Start: 2022-10-06

## 2023-05-30 ENCOUNTER — OFFICE VISIT (OUTPATIENT)
Dept: INTERNAL MEDICINE | Facility: CLINIC | Age: 38
End: 2023-05-30

## 2023-05-30 VITALS
DIASTOLIC BLOOD PRESSURE: 86 MMHG | HEIGHT: 67 IN | OXYGEN SATURATION: 98 % | BODY MASS INDEX: 23.07 KG/M2 | WEIGHT: 147 LBS | TEMPERATURE: 98.4 F | HEART RATE: 92 BPM | RESPIRATION RATE: 16 BRPM | SYSTOLIC BLOOD PRESSURE: 136 MMHG

## 2023-05-30 DIAGNOSIS — H69.81 DYSFUNCTION OF RIGHT EUSTACHIAN TUBE: Primary | ICD-10-CM

## 2023-05-30 PROCEDURE — 99213 OFFICE O/P EST LOW 20 MIN: CPT | Performed by: INTERNAL MEDICINE

## 2023-05-30 RX ORDER — LISDEXAMFETAMINE DIMESYLATE 30 MG/1
30 CAPSULE ORAL EVERY MORNING
COMMUNITY
Start: 2023-05-03

## 2023-05-30 RX ORDER — ATORVASTATIN CALCIUM 40 MG/1
40 TABLET, FILM COATED ORAL DAILY
Qty: 90 TABLET | Refills: 1 | Status: SHIPPED | OUTPATIENT
Start: 2023-05-30

## 2023-05-30 RX ORDER — SUMATRIPTAN 50 MG/1
50 TABLET, FILM COATED ORAL DAILY PRN
Qty: 9 TABLET | Refills: 0 | Status: SHIPPED | OUTPATIENT
Start: 2023-05-30

## 2023-05-30 NOTE — PROGRESS NOTES
Subjective   Erinn Robert is a 38 y.o. female.     Chief Complaint   Patient presents with   • Earache   • Ear Fullness         Earache   Associated symptoms include a sore throat. Pertinent negatives include no coughing or rhinorrhea.   Ear Fullness   Associated symptoms include a sore throat. Pertinent negatives include no coughing or rhinorrhea.        The following portions of the patient's history were reviewed and updated as appropriate: allergies, current medications, past social history and problem list.    Outpatient Medications Marked as Taking for the 5/30/23 encounter (Office Visit) with Karri Jones MD   Medication Sig Dispense Refill   • ALPRAZolam (XANAX) 0.5 MG tablet TAKE ONE TABLET BY MOUTH DAILY AS NEEDED FOR ANXIETY 90 tablet 0   • atorvastatin (LIPITOR) 40 MG tablet Take 1 tablet by mouth Daily. 90 tablet 1   • PARoxetine (PAXIL) 40 MG tablet Take 1 tablet by mouth Daily.     • SUMAtriptan (IMITREX) 50 MG tablet TAKE ONE TABLET BY MOUTH DAILY AS NEEDED 9 tablet 0   • Vyvanse 30 MG capsule Take 1 capsule by mouth Every Morning         Review of Systems   Constitutional: Negative for chills and fever.   HENT: Positive for ear pain and sore throat. Negative for rhinorrhea.    Respiratory: Negative for cough.        Objective   Vitals:    05/30/23 1524   BP: 136/86   Pulse: 92   Resp: 16   Temp: 98.4 °F (36.9 °C)   SpO2: 98%      Wt Readings from Last 3 Encounters:   05/30/23 66.7 kg (147 lb)   11/12/21 65.8 kg (145 lb)   03/26/21 64.9 kg (143 lb)    Body mass index is 23.02 kg/m².      Physical Exam  Constitutional:       Appearance: Normal appearance.   HENT:      Right Ear: Tympanic membrane, ear canal and external ear normal.      Left Ear: Tympanic membrane, ear canal and external ear normal.   Neurological:      Mental Status: She is alert.           Problems Addressed this Visit    None  Visit Diagnoses     Dysfunction of right eustachian tube    -  Primary      Diagnoses        Codes Comments    Dysfunction of right eustachian tube    -  Primary ICD-10-CM: H69.81  ICD-9-CM: 381.81         Assessment & Plan   In today with a fullness in the right ear.  It feels like it is clogged up like is hard to hear out of that ear.  Very annoying.  She has had symptoms for a year but they seem to be getting worse with time.  No actual pain.  She had a strep throat diagnosed 8 days ago and is on amoxicillin 500 mg 3 times daily for a 10-day course.  One of the first spells with her ear occurred also with a strep throat.  Her exam today is normal.  The outer ear is crystal clear.  The canal is clear.  There is no wax.  The drums look great.  Mendoza and Wendy testing is negative.  I am just not sure what to make of her symptoms.  Some of this sounds like eustachian tube dysfunction.  She gets crackles from time to time.  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  May need some imaging to rule out any structural issues.  We will get an ENT opinion.    The above information was reviewed again today 05/30/23.  It continues to be accurate as reflected above and is unchanged.  History, physical and review of systems all reviewed and are unchanged.  Medications were reviewed today and continue the current dosing.    PPE today includes face mask and eye shield.             Dragon disclaimer:   Part of this note may be an electronic transcription/translation of spoken language to printed text using the Dragon Dictation System.

## 2023-09-19 RX ORDER — SUMATRIPTAN 50 MG/1
TABLET, FILM COATED ORAL
Qty: 9 TABLET | Refills: 0 | Status: SHIPPED | OUTPATIENT
Start: 2023-09-19

## 2023-12-06 RX ORDER — SUMATRIPTAN 50 MG/1
TABLET, FILM COATED ORAL
Qty: 9 TABLET | Refills: 0 | Status: SHIPPED | OUTPATIENT
Start: 2023-12-06

## 2024-01-04 ENCOUNTER — OFFICE VISIT (OUTPATIENT)
Dept: INTERNAL MEDICINE | Facility: CLINIC | Age: 39
End: 2024-01-04
Payer: COMMERCIAL

## 2024-01-04 ENCOUNTER — LAB (OUTPATIENT)
Dept: LAB | Facility: HOSPITAL | Age: 39
End: 2024-01-04
Payer: COMMERCIAL

## 2024-01-04 VITALS
OXYGEN SATURATION: 98 % | HEART RATE: 83 BPM | HEIGHT: 67 IN | SYSTOLIC BLOOD PRESSURE: 110 MMHG | RESPIRATION RATE: 18 BRPM | WEIGHT: 150 LBS | BODY MASS INDEX: 23.54 KG/M2 | DIASTOLIC BLOOD PRESSURE: 70 MMHG | TEMPERATURE: 97.7 F

## 2024-01-04 DIAGNOSIS — E78.5 HYPERLIPIDEMIA, UNSPECIFIED HYPERLIPIDEMIA TYPE: ICD-10-CM

## 2024-01-04 DIAGNOSIS — Z72.0 TOBACCO USE: ICD-10-CM

## 2024-01-04 DIAGNOSIS — Z23 NEED FOR VACCINATION: ICD-10-CM

## 2024-01-04 DIAGNOSIS — Z00.00 ENCOUNTER FOR PREVENTIVE HEALTH EXAMINATION: Primary | ICD-10-CM

## 2024-01-04 LAB
ALBUMIN SERPL-MCNC: 4.8 G/DL (ref 3.5–5.2)
ALBUMIN/GLOB SERPL: 2.2 G/DL
ALP SERPL-CCNC: 81 U/L (ref 39–117)
ALT SERPL W P-5'-P-CCNC: 28 U/L (ref 1–33)
ANION GAP SERPL CALCULATED.3IONS-SCNC: 11.3 MMOL/L (ref 5–15)
AST SERPL-CCNC: 22 U/L (ref 1–32)
BASOPHILS # BLD AUTO: 0.03 10*3/MM3 (ref 0–0.2)
BASOPHILS NFR BLD AUTO: 0.5 % (ref 0–1.5)
BILIRUB SERPL-MCNC: 0.5 MG/DL (ref 0–1.2)
BUN SERPL-MCNC: 13 MG/DL (ref 6–20)
BUN/CREAT SERPL: 14.3 (ref 7–25)
CALCIUM SPEC-SCNC: 9.5 MG/DL (ref 8.6–10.5)
CHLORIDE SERPL-SCNC: 102 MMOL/L (ref 98–107)
CHOLEST SERPL-MCNC: 163 MG/DL (ref 0–200)
CO2 SERPL-SCNC: 25.7 MMOL/L (ref 22–29)
CREAT SERPL-MCNC: 0.91 MG/DL (ref 0.57–1)
DEPRECATED RDW RBC AUTO: 44.9 FL (ref 37–54)
EGFRCR SERPLBLD CKD-EPI 2021: 83 ML/MIN/1.73
EOSINOPHIL # BLD AUTO: 0.1 10*3/MM3 (ref 0–0.4)
EOSINOPHIL NFR BLD AUTO: 1.6 % (ref 0.3–6.2)
ERYTHROCYTE [DISTWIDTH] IN BLOOD BY AUTOMATED COUNT: 12.6 % (ref 12.3–15.4)
GLOBULIN UR ELPH-MCNC: 2.2 GM/DL
GLUCOSE SERPL-MCNC: 91 MG/DL (ref 65–99)
HCT VFR BLD AUTO: 39 % (ref 34–46.6)
HDLC SERPL QL: 1.72
HDLC SERPL-MCNC: 95 MG/DL (ref 40–60)
HGB BLD-MCNC: 13.3 G/DL (ref 12–15.9)
IMM GRANULOCYTES # BLD AUTO: 0.01 10*3/MM3 (ref 0–0.05)
IMM GRANULOCYTES NFR BLD AUTO: 0.2 % (ref 0–0.5)
LDLC SERPL CALC-MCNC: 58 MG/DL (ref 0–100)
LYMPHOCYTES # BLD AUTO: 3.13 10*3/MM3 (ref 0.7–3.1)
LYMPHOCYTES NFR BLD AUTO: 51.1 % (ref 19.6–45.3)
MCH RBC QN AUTO: 32.8 PG (ref 26.6–33)
MCHC RBC AUTO-ENTMCNC: 34.1 G/DL (ref 31.5–35.7)
MCV RBC AUTO: 96.1 FL (ref 79–97)
MONOCYTES # BLD AUTO: 0.47 10*3/MM3 (ref 0.1–0.9)
MONOCYTES NFR BLD AUTO: 7.7 % (ref 5–12)
NEUTROPHILS NFR BLD AUTO: 2.39 10*3/MM3 (ref 1.7–7)
NEUTROPHILS NFR BLD AUTO: 38.9 % (ref 42.7–76)
NRBC BLD AUTO-RTO: 0 /100 WBC (ref 0–0.2)
PLATELET # BLD AUTO: 235 10*3/MM3 (ref 140–450)
PMV BLD AUTO: 10.2 FL (ref 6–12)
POTASSIUM SERPL-SCNC: 4 MMOL/L (ref 3.5–5.2)
PROT SERPL-MCNC: 7 G/DL (ref 6–8.5)
RBC # BLD AUTO: 4.06 10*6/MM3 (ref 3.77–5.28)
SODIUM SERPL-SCNC: 139 MMOL/L (ref 136–145)
TRIGL SERPL-MCNC: 45 MG/DL (ref 0–150)
VLDLC SERPL-MCNC: 10 MG/DL (ref 5–40)
WBC NRBC COR # BLD AUTO: 6.13 10*3/MM3 (ref 3.4–10.8)

## 2024-01-04 PROCEDURE — 80053 COMPREHEN METABOLIC PANEL: CPT | Performed by: INTERNAL MEDICINE

## 2024-01-04 PROCEDURE — 80061 LIPID PANEL: CPT | Performed by: INTERNAL MEDICINE

## 2024-01-04 PROCEDURE — 99395 PREV VISIT EST AGE 18-39: CPT | Performed by: INTERNAL MEDICINE

## 2024-01-04 PROCEDURE — 36415 COLL VENOUS BLD VENIPUNCTURE: CPT | Performed by: INTERNAL MEDICINE

## 2024-01-04 PROCEDURE — 85025 COMPLETE CBC W/AUTO DIFF WBC: CPT | Performed by: INTERNAL MEDICINE

## 2024-01-04 RX ORDER — ATORVASTATIN CALCIUM 40 MG/1
40 TABLET, FILM COATED ORAL DAILY
Qty: 90 TABLET | Refills: 3 | Status: SHIPPED | OUTPATIENT
Start: 2024-01-04

## 2024-01-04 NOTE — PROGRESS NOTES
Subjective   Erinn Robert is a 38 y.o. female.     Chief Complaint   Patient presents with    Annual Exam    Hyperlipidemia    Anxiety         History of Present Illness  In for annual preventative exam.  Sleep is good.  Gets 10-12 hours at night.  Exercises daily for 1 hour with yoga.  Energy is good.  Diet is well-balanced.    Hyperlipidemia  This is a chronic problem. Pertinent negatives include no chest pain, myalgias or shortness of breath.   Anxiety  Presents for follow-up visit. Symptoms include nervous/anxious behavior. Patient reports no chest pain, confusion, dizziness, nausea, palpitations ( With increased stress and caffeine) or shortness of breath.            The following portions of the patient's history were reviewed and updated as appropriate: allergies, current medications, past social history and problem list.    HISTORY  Outpatient Medications Marked as Taking for the 1/4/24 encounter (Office Visit) with Karri Jones MD   Medication Sig Dispense Refill    ALPRAZolam (XANAX) 0.5 MG tablet TAKE ONE TABLET BY MOUTH DAILY AS NEEDED FOR ANXIETY 90 tablet 0    atorvastatin (LIPITOR) 40 MG tablet Take 1 tablet by mouth Daily. 90 tablet 1    PARoxetine (PAXIL) 40 MG tablet Take 1 tablet by mouth Daily.      SUMAtriptan (IMITREX) 50 MG tablet TAKE 1 TABLET BY MOUTH AT ONSET OF HEADACHE; MAY REPEAT 1 TABLET IN 2 HOURS IF NEEDED. 9 tablet 0     Social History     Socioeconomic History    Marital status: Single   Tobacco Use    Smoking status: Some Days     Packs/day: .25     Types: Cigarettes     Start date: 2006    Smokeless tobacco: Never    Tobacco comments:     10 CIGARETTES X WEEK   Vaping Use    Vaping Use: Never used   Substance and Sexual Activity    Alcohol use: Yes     Alcohol/week: 8.0 standard drinks of alcohol     Types: 8 Glasses of wine per week     Comment: WIne: 8 glasses x week     Family History   Problem Relation Age of Onset    No Known Problems Mother     No Known Problems Father      No Known Problems Brother     No Known Problems Brother      History reviewed. No pertinent past medical history.  Past Surgical History:   Procedure Laterality Date    WISDOM TOOTH EXTRACTION         Review of Systems   Constitutional:  Negative for appetite change, chills, diaphoresis, fatigue, fever and unexpected weight change.   Respiratory:  Negative for cough, chest tightness, shortness of breath and wheezing.    Cardiovascular:  Negative for chest pain, palpitations ( With increased stress and caffeine) and leg swelling.   Gastrointestinal:  Negative for abdominal pain, anal bleeding, blood in stool, constipation, diarrhea, nausea, rectal pain and vomiting.   Endocrine: Negative for cold intolerance, heat intolerance and polyuria.   Genitourinary:  Negative for difficulty urinating, dysuria, flank pain, frequency, hematuria and urgency.   Musculoskeletal:  Negative for arthralgias, back pain and myalgias.   Skin:  Negative for rash.   Allergic/Immunologic: Negative for environmental allergies.   Neurological:  Negative for dizziness, syncope, speech difficulty, weakness, light-headedness, numbness and headaches.   Hematological:  Does not bruise/bleed easily.   Psychiatric/Behavioral:  Positive for dysphoric mood. Negative for agitation, confusion and sleep disturbance. The patient is nervous/anxious.        Objective   Vitals:    01/04/24 1422   BP: 110/70   Pulse: 83   Resp: 18   Temp: 97.7 °F (36.5 °C)   SpO2: 98%          01/04/24  1422   Weight: 68 kg (150 lb)    [unfilled]  Body mass index is 23.49 kg/m².      Physical Exam   Constitutional: She is oriented to person, place, and time. She appears well-developed.   HENT:   Head: Normocephalic and atraumatic.   Right Ear: External ear normal.   Left Ear: External ear normal.   Nose: Nose normal.   Eyes: Pupils are equal, round, and reactive to light. Conjunctivae are normal.   Neck: No JVD present. No thyromegaly present.   Cardiovascular: Normal  rate, regular rhythm and normal heart sounds. Exam reveals no gallop.   No murmur heard.  Pulmonary/Chest: Effort normal and breath sounds normal. No respiratory distress. She has no wheezes. She has no rales.   Abdominal: Soft. Normal appearance and bowel sounds are normal. She exhibits no distension and no mass. There is no abdominal tenderness. There is no guarding. No hernia.   Musculoskeletal: Normal range of motion.   Lymphadenopathy:     She has no cervical adenopathy.   Neurological: She is alert and oriented to person, place, and time. She displays normal reflexes. No cranial nerve deficit. Coordination normal.   Skin: Skin is warm and dry.   Psychiatric: Her behavior is normal. Mood, judgment and thought content normal.   Nursing note and vitals reviewed.        Problems Addressed this Visit          Cardiac and Vasculature    Hyperlipidemia     Other Visit Diagnoses       Encounter for preventive health examination    -  Primary    Tobacco use              Diagnoses         Codes Comments    Encounter for preventive health examination    -  Primary ICD-10-CM: Z00.00  ICD-9-CM: V70.0     Hyperlipidemia, unspecified hyperlipidemia type     ICD-10-CM: E78.5  ICD-9-CM: 272.4     Tobacco use     ICD-10-CM: Z72.0  ICD-9-CM: 305.1           Assessment & Plan   In for annual preventive exam and recheck of hyperlipidemia today January 2024.  She has GERD but that's more related to stress than anything.  This responded to PPIs however.  Currently not causing any issues.  Known gallstones.  Allergic rhinitis.  Chronic anxiety and depression.  That is under treatment with psychiatrist.  Annual lab work including CBC, CMP, lipids, and UA today March 2021.  Tobacco is down to 1 cigarette every 1 to 2 weeks.  Will update Tdap and Prevnar 20 today.  Continue follow-up once yearly at this point.  She is fasting today.    Prevention counseling was performed today. The counseling performed was routine health maintenance  topics including BMI and exercise.    The above information was reviewed again today 01/04/24.  It continues to be accurate as reflected above and is unchanged.  History, physical and review of systems all reviewed and are unchanged.  Medications were reviewed today and continue the current dosing.           Ruth disclaimer:   Much of this encounter note is an electronic transcription/translation of spoken language to printed text. The electronic translation of spoken language may permit erroneous, or at times, nonsensical words or phrases to be inadvertently transcribed; Although I have reviewed the note for such errors, some may still exist.

## 2024-01-10 ENCOUNTER — PATIENT ROUNDING (BHMG ONLY) (OUTPATIENT)
Dept: INTERNAL MEDICINE | Facility: CLINIC | Age: 39
End: 2024-01-10
Payer: COMMERCIAL

## 2024-01-10 NOTE — PROGRESS NOTES
January 10, 2024    Hello, may I speak with Erinn Robert?    My name is Rosa    I am  with [facility]  [address] [place] [id number] [phone].    Before we get started may I verify your date of birth? 1985    I am calling to officially welcome you to our practice and ask about your recent visit. Is this a good time to talk? yes    Tell me about your visit with us. What things went well?  My visit was very good, I have no complaints.       We're always looking for ways to make our patients' experiences even better. Do you have recommendations on ways we may improve?  no    Overall were you satisfied with your first visit to our practice? yes       I appreciate you taking the time to speak with me today. Is there anything else I can do for you? no      Thank you, and have a great day.

## 2024-01-25 RX ORDER — SUMATRIPTAN 50 MG/1
TABLET, FILM COATED ORAL
Qty: 9 TABLET | Refills: 0 | Status: SHIPPED | OUTPATIENT
Start: 2024-01-25

## 2024-06-12 RX ORDER — SUMATRIPTAN 50 MG/1
TABLET, FILM COATED ORAL
Qty: 9 TABLET | Refills: 0 | Status: SHIPPED | OUTPATIENT
Start: 2024-06-12

## 2025-01-14 RX ORDER — ATORVASTATIN CALCIUM 40 MG/1
40 TABLET, FILM COATED ORAL DAILY
Qty: 30 TABLET | Refills: 1 | Status: SHIPPED | OUTPATIENT
Start: 2025-01-14

## 2025-01-15 ENCOUNTER — OFFICE VISIT (OUTPATIENT)
Dept: INTERNAL MEDICINE | Facility: CLINIC | Age: 40
End: 2025-01-15
Payer: COMMERCIAL

## 2025-01-15 ENCOUNTER — LAB (OUTPATIENT)
Dept: LAB | Facility: HOSPITAL | Age: 40
End: 2025-01-15
Payer: COMMERCIAL

## 2025-01-15 VITALS
BODY MASS INDEX: 23.39 KG/M2 | TEMPERATURE: 97.9 F | WEIGHT: 149 LBS | HEART RATE: 100 BPM | RESPIRATION RATE: 18 BRPM | OXYGEN SATURATION: 98 % | HEIGHT: 67 IN | SYSTOLIC BLOOD PRESSURE: 110 MMHG | DIASTOLIC BLOOD PRESSURE: 80 MMHG

## 2025-01-15 DIAGNOSIS — Z00.00 ENCOUNTER FOR PREVENTIVE HEALTH EXAMINATION: Primary | ICD-10-CM

## 2025-01-15 LAB
ALBUMIN SERPL-MCNC: 5 G/DL (ref 3.5–5.2)
ALBUMIN/GLOB SERPL: 1.8 G/DL
ALP SERPL-CCNC: 79 U/L (ref 39–117)
ALT SERPL W P-5'-P-CCNC: 21 U/L (ref 1–33)
ANION GAP SERPL CALCULATED.3IONS-SCNC: 8 MMOL/L (ref 5–15)
AST SERPL-CCNC: 19 U/L (ref 1–32)
BASOPHILS # BLD AUTO: 0.03 10*3/MM3 (ref 0–0.2)
BASOPHILS NFR BLD AUTO: 0.5 % (ref 0–1.5)
BILIRUB SERPL-MCNC: 0.3 MG/DL (ref 0–1.2)
BUN SERPL-MCNC: 8 MG/DL (ref 6–20)
BUN/CREAT SERPL: 9 (ref 7–25)
CALCIUM SPEC-SCNC: 9.6 MG/DL (ref 8.6–10.5)
CHLORIDE SERPL-SCNC: 104 MMOL/L (ref 98–107)
CHOLEST SERPL-MCNC: 160 MG/DL (ref 0–200)
CO2 SERPL-SCNC: 27 MMOL/L (ref 22–29)
CREAT SERPL-MCNC: 0.89 MG/DL (ref 0.57–1)
DEPRECATED RDW RBC AUTO: 45.3 FL (ref 37–54)
EGFRCR SERPLBLD CKD-EPI 2021: 84.7 ML/MIN/1.73
EOSINOPHIL # BLD AUTO: 0.11 10*3/MM3 (ref 0–0.4)
EOSINOPHIL NFR BLD AUTO: 1.8 % (ref 0.3–6.2)
ERYTHROCYTE [DISTWIDTH] IN BLOOD BY AUTOMATED COUNT: 12.4 % (ref 12.3–15.4)
GLOBULIN UR ELPH-MCNC: 2.8 GM/DL
GLUCOSE SERPL-MCNC: 87 MG/DL (ref 65–99)
HCT VFR BLD AUTO: 42.3 % (ref 34–46.6)
HDLC SERPL QL: 1.67
HDLC SERPL-MCNC: 96 MG/DL (ref 40–60)
HGB BLD-MCNC: 13.7 G/DL (ref 12–15.9)
IMM GRANULOCYTES # BLD AUTO: 0.02 10*3/MM3 (ref 0–0.05)
IMM GRANULOCYTES NFR BLD AUTO: 0.3 % (ref 0–0.5)
LDLC SERPL CALC-MCNC: 53 MG/DL (ref 0–100)
LYMPHOCYTES # BLD AUTO: 2.81 10*3/MM3 (ref 0.7–3.1)
LYMPHOCYTES NFR BLD AUTO: 45.5 % (ref 19.6–45.3)
MCH RBC QN AUTO: 31.7 PG (ref 26.6–33)
MCHC RBC AUTO-ENTMCNC: 32.4 G/DL (ref 31.5–35.7)
MCV RBC AUTO: 97.9 FL (ref 79–97)
MONOCYTES # BLD AUTO: 0.39 10*3/MM3 (ref 0.1–0.9)
MONOCYTES NFR BLD AUTO: 6.3 % (ref 5–12)
NEUTROPHILS NFR BLD AUTO: 2.81 10*3/MM3 (ref 1.7–7)
NEUTROPHILS NFR BLD AUTO: 45.6 % (ref 42.7–76)
NRBC BLD AUTO-RTO: 0 /100 WBC (ref 0–0.2)
PLATELET # BLD AUTO: 232 10*3/MM3 (ref 140–450)
PMV BLD AUTO: 10.3 FL (ref 6–12)
POTASSIUM SERPL-SCNC: 4.1 MMOL/L (ref 3.5–5.2)
PROT SERPL-MCNC: 7.8 G/DL (ref 6–8.5)
RBC # BLD AUTO: 4.32 10*6/MM3 (ref 3.77–5.28)
SODIUM SERPL-SCNC: 139 MMOL/L (ref 136–145)
TRIGL SERPL-MCNC: 50 MG/DL (ref 0–150)
TSH SERPL DL<=0.05 MIU/L-ACNC: 1.71 UIU/ML (ref 0.27–4.2)
VLDLC SERPL-MCNC: 11 MG/DL (ref 5–40)
WBC NRBC COR # BLD AUTO: 6.17 10*3/MM3 (ref 3.4–10.8)

## 2025-01-15 PROCEDURE — 36415 COLL VENOUS BLD VENIPUNCTURE: CPT | Performed by: INTERNAL MEDICINE

## 2025-01-15 PROCEDURE — 99395 PREV VISIT EST AGE 18-39: CPT | Performed by: INTERNAL MEDICINE

## 2025-01-15 PROCEDURE — 80050 GENERAL HEALTH PANEL: CPT | Performed by: INTERNAL MEDICINE

## 2025-01-15 PROCEDURE — 80061 LIPID PANEL: CPT | Performed by: INTERNAL MEDICINE

## 2025-01-15 RX ORDER — SUMATRIPTAN 50 MG/1
TABLET, FILM COATED ORAL
Qty: 9 TABLET | Refills: 2 | Status: SHIPPED | OUTPATIENT
Start: 2025-01-15

## 2025-01-15 RX ORDER — DEXTROMETHORPHAN HYDROBROMIDE, BUPROPION HYDROCHLORIDE 105; 45 MG/1; MG/1
1 TABLET, MULTILAYER, EXTENDED RELEASE ORAL 2 TIMES DAILY
COMMUNITY

## 2025-01-15 RX ORDER — TRAZODONE HYDROCHLORIDE 50 MG/1
50 TABLET, FILM COATED ORAL NIGHTLY PRN
Start: 2025-01-15

## 2025-01-15 NOTE — PROGRESS NOTES
Subjective   Erinn Robert is a 39 y.o. female.     Chief Complaint   Patient presents with    Annual Exam    Hyperlipidemia    Heartburn         History of Present Illness  In for annual preventative exam.  Sleep is good.  Gets 8 hours at night.  Exercises 2 to 3 days weekly for 1 hour with yoga.  Energy is good.  Diet is well-balanced.    Hyperlipidemia  This is a chronic problem. Pertinent negatives include no chest pain, myalgias or shortness of breath.   Anxiety  Presents for follow-up visit. Symptoms include nervous/anxious behavior. Patient reports no chest pain, confusion, dizziness, nausea, palpitations ( With increased stress and caffeine) or shortness of breath.            The following portions of the patient's history were reviewed and updated as appropriate: allergies, current medications, past social history and problem list.    HISTORY  Outpatient Medications Marked as Taking for the 1/15/25 encounter (Office Visit) with Karri Jones MD   Medication Sig Dispense Refill    ALPRAZolam (XANAX) 0.5 MG tablet TAKE ONE TABLET BY MOUTH DAILY AS NEEDED FOR ANXIETY 90 tablet 0    atorvastatin (LIPITOR) 40 MG tablet TAKE 1 TABLET BY MOUTH DAILY 30 tablet 1    Dextromethorphan-buPROPion ER (Auvelity)  MG tablet controlled-release Take 1 tablet by mouth 2 (Two) Times a Day.      PARoxetine (PAXIL) 40 MG tablet Take 1 tablet by mouth Daily.      SUMAtriptan (IMITREX) 50 MG tablet TAKE 1 TABLET BY MOUTH AT ONSET OF HEADACHE; MAY REPEAT 1 TABLET IN 2 HOURS IF NEEDED. 9 tablet 0    Vyvanse 30 MG capsule Take 1 capsule by mouth Every Morning (Patient taking differently: Take 20 mg by mouth Every Morning)       Social History     Socioeconomic History    Marital status: Single   Tobacco Use    Smoking status: Some Days     Current packs/day: 0.25     Average packs/day: 0.3 packs/day for 19.0 years (4.8 ttl pk-yrs)     Types: Cigarettes     Start date: 2006    Smokeless tobacco: Never    Tobacco comments:      10 CIGARETTES X WEEK   Vaping Use    Vaping status: Never Used   Substance and Sexual Activity    Alcohol use: Yes     Alcohol/week: 8.0 standard drinks of alcohol     Types: 8 Glasses of wine per week     Comment: WIne: 8 glasses x week     Family History   Problem Relation Age of Onset    No Known Problems Mother     No Known Problems Father     No Known Problems Brother     No Known Problems Brother      History reviewed. No pertinent past medical history.  Past Surgical History:   Procedure Laterality Date    WISDOM TOOTH EXTRACTION         Review of Systems   Constitutional:  Negative for appetite change, chills, diaphoresis, fatigue, fever and unexpected weight change.   Respiratory:  Negative for cough, chest tightness, shortness of breath and wheezing.    Cardiovascular:  Negative for chest pain, palpitations ( With increased stress and caffeine) and leg swelling.   Gastrointestinal:  Negative for abdominal pain, anal bleeding, blood in stool, constipation, diarrhea, nausea, rectal pain and vomiting.   Endocrine: Negative for cold intolerance, heat intolerance and polyuria.   Genitourinary:  Negative for difficulty urinating, dysuria, flank pain, frequency, hematuria and urgency.   Musculoskeletal:  Negative for arthralgias, back pain and myalgias.   Allergic/Immunologic: Negative for environmental allergies.   Neurological:  Negative for dizziness, syncope, speech difficulty, weakness, light-headedness, numbness and headaches.   Hematological:  Does not bruise/bleed easily.   Psychiatric/Behavioral:  Positive for dysphoric mood. Negative for agitation, confusion and sleep disturbance. The patient is nervous/anxious.        Objective   Vitals:    01/15/25 0812   BP: 110/80   Pulse: 100   Resp: 18   Temp: 97.9 °F (36.6 °C)   SpO2: 98%          01/15/25  0812   Weight: 67.6 kg (149 lb)    [unfilled]  Body mass index is 23.34 kg/m².      Physical Exam   Constitutional: She is oriented to person, place, and  time. She appears well-developed.   HENT:   Head: Normocephalic and atraumatic.   Right Ear: Tympanic membrane and external ear normal.   Left Ear: Tympanic membrane and external ear normal.   Nose: Nose normal.   Eyes: Pupils are equal, round, and reactive to light. Conjunctivae are normal.   Neck: No JVD present. No thyromegaly present.   Cardiovascular: Normal rate, regular rhythm and normal heart sounds. Exam reveals no gallop.   No murmur heard.  Pulmonary/Chest: Effort normal and breath sounds normal. No respiratory distress. She has no wheezes. She has no rales.   Abdominal: Soft. Normal appearance and bowel sounds are normal. She exhibits no distension and no mass. There is no abdominal tenderness. There is no guarding. No hernia.   Musculoskeletal: Normal range of motion.   Lymphadenopathy:     She has no cervical adenopathy.   Neurological: She is alert and oriented to person, place, and time. She displays normal reflexes. No cranial nerve deficit. Coordination normal.   Skin: Skin is warm and dry.   Psychiatric: Her behavior is normal. Mood, judgment and thought content normal.   Nursing note and vitals reviewed.        Problems Addressed this Visit    None  Visit Diagnoses       Encounter for preventive health examination    -  Primary          Diagnoses         Codes Comments    Encounter for preventive health examination    -  Primary ICD-10-CM: Z00.00  ICD-9-CM: V70.0           Assessment & Plan   In for annual preventive exam and recheck of hyperlipidemia today January 2025.  She has GERD but that's more related to stress than anything.  This is no longer an issue.  Known gallstones.  Allergic rhinitis.  Chronic anxiety and depression.  That is under treatment with psychiatrist.  Annual lab work including CBC, CMP, lipids, and UA today January 2025.  Tobacco is down to 1 cigarette every 1 to 2 weeks.  Continue follow-up once yearly at this point.  She is fasting today.    Prevention counseling was  performed today. The counseling performed was routine health maintenance topics including BMI and exercise.    The above information was reviewed again today 01/15/25.  It continues to be accurate as reflected above and is unchanged.  History, physical and review of systems all reviewed and are unchanged.  Medications were reviewed today and continue the current dosing.             Ruth disclaimer:   Much of this encounter note is an electronic transcription/translation of spoken language to printed text. The electronic translation of spoken language may permit erroneous, or at times, nonsensical words or phrases to be inadvertently transcribed; Although I have reviewed the note for such errors, some may still exist.

## 2025-03-24 RX ORDER — ATORVASTATIN CALCIUM 40 MG/1
40 TABLET, FILM COATED ORAL DAILY
Qty: 30 TABLET | Refills: 1 | Status: SHIPPED | OUTPATIENT
Start: 2025-03-24